# Patient Record
Sex: FEMALE | Race: WHITE | HISPANIC OR LATINO | Employment: UNEMPLOYED | URBAN - METROPOLITAN AREA
[De-identification: names, ages, dates, MRNs, and addresses within clinical notes are randomized per-mention and may not be internally consistent; named-entity substitution may affect disease eponyms.]

---

## 2017-01-18 ENCOUNTER — ALLSCRIPTS OFFICE VISIT (OUTPATIENT)
Dept: OTHER | Facility: OTHER | Age: 52
End: 2017-01-18

## 2017-02-24 ENCOUNTER — APPOINTMENT (EMERGENCY)
Dept: RADIOLOGY | Facility: HOSPITAL | Age: 52
End: 2017-02-24
Payer: COMMERCIAL

## 2017-02-24 ENCOUNTER — HOSPITAL ENCOUNTER (EMERGENCY)
Facility: HOSPITAL | Age: 52
Discharge: HOME | End: 2017-02-24
Attending: EMERGENCY MEDICINE | Admitting: EMERGENCY MEDICINE
Payer: COMMERCIAL

## 2017-02-24 VITALS
SYSTOLIC BLOOD PRESSURE: 140 MMHG | OXYGEN SATURATION: 98 % | DIASTOLIC BLOOD PRESSURE: 78 MMHG | TEMPERATURE: 96.5 F | RESPIRATION RATE: 18 BRPM | BODY MASS INDEX: 38.74 KG/M2 | HEART RATE: 77 BPM | WEIGHT: 240 LBS

## 2017-02-24 DIAGNOSIS — N39.0 UTI (URINARY TRACT INFECTION): Primary | ICD-10-CM

## 2017-02-24 DIAGNOSIS — M54.9 ACUTE BACK PAIN: ICD-10-CM
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
## 2017-02-24 LAB
BACTERIA UR QL AUTO: ABNORMAL /HPF
BILIRUB UR QL STRIP: NEGATIVE
CLARITY UR: CLEAR
COLOR UR: YELLOW
GLUCOSE UR STRIP-MCNC: NEGATIVE MG/DL
HGB UR QL STRIP.AUTO: ABNORMAL
HYALINE CASTS #/AREA URNS LPF: ABNORMAL /LPF
KETONES UR STRIP-MCNC: NEGATIVE MG/DL
LEUKOCYTE ESTERASE UR QL STRIP: NEGATIVE
NITRITE UR QL STRIP: NEGATIVE
NON-SQ EPI CELLS URNS QL MICRO: ABNORMAL /HPF
PH UR STRIP.AUTO: 6.5 [PH] (ref 4.5–8)
PROT UR STRIP-MCNC: NEGATIVE MG/DL
RBC #/AREA URNS AUTO: ABNORMAL /HPF
SP GR UR STRIP.AUTO: 1.01 (ref 1–1.03)
UROBILINOGEN UR QL STRIP.AUTO: 1 E.U./DL
WBC #/AREA URNS AUTO: ABNORMAL /HPF

## 2017-02-24 PROCEDURE — 96372 THER/PROPH/DIAG INJ SC/IM: CPT

## 2017-02-24 PROCEDURE — 51798 US URINE CAPACITY MEASURE: CPT

## 2017-02-24 PROCEDURE — 87086 URINE CULTURE/COLONY COUNT: CPT | Performed by: EMERGENCY MEDICINE

## 2017-02-24 PROCEDURE — 81001 URINALYSIS AUTO W/SCOPE: CPT | Performed by: EMERGENCY MEDICINE

## 2017-02-24 PROCEDURE — 72100 X-RAY EXAM L-S SPINE 2/3 VWS: CPT

## 2017-02-24 PROCEDURE — 73521 X-RAY EXAM HIPS BI 2 VIEWS: CPT

## 2017-02-24 PROCEDURE — 99284 EMERGENCY DEPT VISIT MOD MDM: CPT

## 2017-02-24 RX ORDER — KETOROLAC TROMETHAMINE 30 MG/ML
15 INJECTION, SOLUTION INTRAMUSCULAR; INTRAVENOUS ONCE
Status: COMPLETED | OUTPATIENT
Start: 2017-02-24 | End: 2017-02-24

## 2017-02-24 RX ORDER — LIDOCAINE 50 MG/G
1 PATCH TOPICAL ONCE
Qty: 30 PATCH | Refills: 0 | Status: SHIPPED | OUTPATIENT
Start: 2017-02-24 | End: 2018-07-12 | Stop reason: ALTCHOICE

## 2017-02-24 RX ORDER — METHOCARBAMOL 500 MG/1
500 TABLET, FILM COATED ORAL 3 TIMES DAILY
Qty: 30 TABLET | Refills: 0 | Status: SHIPPED | OUTPATIENT
Start: 2017-02-24 | End: 2018-07-12 | Stop reason: ALTCHOICE

## 2017-02-24 RX ORDER — LIDOCAINE 50 MG/G
1 PATCH TOPICAL ONCE
Status: DISCONTINUED | OUTPATIENT
Start: 2017-02-24 | End: 2017-02-24 | Stop reason: HOSPADM

## 2017-02-24 RX ORDER — METHOCARBAMOL 500 MG/1
500 TABLET, FILM COATED ORAL ONCE
Status: COMPLETED | OUTPATIENT
Start: 2017-02-24 | End: 2017-02-24

## 2017-02-24 RX ORDER — CEPHALEXIN 500 MG/1
500 CAPSULE ORAL 4 TIMES DAILY
Qty: 20 CAPSULE | Refills: 0 | Status: SHIPPED | OUTPATIENT
Start: 2017-02-24 | End: 2017-03-01

## 2017-02-24 RX ORDER — NAPROXEN 500 MG/1
500 TABLET ORAL 2 TIMES DAILY WITH MEALS
Qty: 60 TABLET | Refills: 0 | Status: SHIPPED | OUTPATIENT
Start: 2017-02-24 | End: 2018-08-09 | Stop reason: ALTCHOICE

## 2017-02-24 RX ADMIN — METHOCARBAMOL 500 MG: 500 TABLET ORAL at 11:11

## 2017-02-24 RX ADMIN — LIDOCAINE 1 PATCH: 50 PATCH CUTANEOUS at 11:12

## 2017-02-24 RX ADMIN — KETOROLAC TROMETHAMINE 15 MG: 30 INJECTION, SOLUTION INTRAMUSCULAR at 10:42

## 2017-02-25 LAB — BACTERIA UR CULT: NORMAL

## 2017-03-23 ENCOUNTER — HOSPITAL ENCOUNTER (EMERGENCY)
Facility: HOSPITAL | Age: 52
Discharge: HOME | End: 2017-03-23
Attending: EMERGENCY MEDICINE | Admitting: EMERGENCY MEDICINE
Payer: COMMERCIAL

## 2017-03-23 VITALS
SYSTOLIC BLOOD PRESSURE: 147 MMHG | DIASTOLIC BLOOD PRESSURE: 93 MMHG | WEIGHT: 248 LBS | RESPIRATION RATE: 18 BRPM | BODY MASS INDEX: 39.86 KG/M2 | HEART RATE: 80 BPM | OXYGEN SATURATION: 99 % | TEMPERATURE: 98 F | HEIGHT: 66 IN

## 2017-03-23 DIAGNOSIS — S39.012A LUMBOSACRAL STRAIN, INITIAL ENCOUNTER: Primary | ICD-10-CM

## 2017-03-23 PROCEDURE — 99283 EMERGENCY DEPT VISIT LOW MDM: CPT

## 2017-03-23 RX ORDER — DIAZEPAM 5 MG/1
5 TABLET ORAL EVERY 6 HOURS PRN
Qty: 9 TABLET | Refills: 0 | Status: SHIPPED | OUTPATIENT
Start: 2017-03-23 | End: 2018-07-12 | Stop reason: ALTCHOICE

## 2017-03-23 RX ORDER — DIAZEPAM 5 MG/1
5 TABLET ORAL ONCE
Status: COMPLETED | OUTPATIENT
Start: 2017-03-23 | End: 2017-03-23

## 2017-03-23 RX ORDER — KETOROLAC TROMETHAMINE 30 MG/ML
15 INJECTION, SOLUTION INTRAMUSCULAR; INTRAVENOUS ONCE
Status: COMPLETED | OUTPATIENT
Start: 2017-03-23 | End: 2017-03-23

## 2017-03-23 RX ADMIN — KETOROLAC TROMETHAMINE 15 MG: 30 INJECTION, SOLUTION INTRAMUSCULAR at 11:17

## 2017-03-23 RX ADMIN — DIAZEPAM 5 MG: 5 TABLET ORAL at 11:18

## 2017-06-26 ENCOUNTER — HOSPITAL ENCOUNTER (EMERGENCY)
Facility: HOSPITAL | Age: 52
Discharge: HOME | End: 2017-06-26
Attending: EMERGENCY MEDICINE
Payer: COMMERCIAL

## 2017-06-26 ENCOUNTER — APPOINTMENT (EMERGENCY)
Dept: RADIOLOGY | Facility: HOSPITAL | Age: 52
End: 2017-06-26
Payer: COMMERCIAL

## 2017-06-26 VITALS
DIASTOLIC BLOOD PRESSURE: 59 MMHG | BODY MASS INDEX: 40.18 KG/M2 | TEMPERATURE: 96.8 F | WEIGHT: 250 LBS | HEIGHT: 66 IN | SYSTOLIC BLOOD PRESSURE: 111 MMHG | RESPIRATION RATE: 20 BRPM | HEART RATE: 69 BPM | OXYGEN SATURATION: 94 %

## 2017-06-26 DIAGNOSIS — J06.9 UPPER RESPIRATORY INFECTION, ACUTE: Primary | ICD-10-CM

## 2017-06-26 LAB
ATRIAL RATE: 62 BPM
P AXIS: 18 DEGREES
PR INTERVAL: 106 MS
QRS AXIS: 39 DEGREES
QRSD INTERVAL: 70 MS
QT INTERVAL: 404 MS
QTC INTERVAL: 410 MS
T WAVE AXIS: 39 DEGREES
VENTRICULAR RATE: 62 BPM

## 2017-06-26 PROCEDURE — 94640 AIRWAY INHALATION TREATMENT: CPT

## 2017-06-26 PROCEDURE — 99283 EMERGENCY DEPT VISIT LOW MDM: CPT

## 2017-06-26 PROCEDURE — 93005 ELECTROCARDIOGRAM TRACING: CPT | Performed by: EMERGENCY MEDICINE

## 2017-06-26 PROCEDURE — 71020 HB CHEST X-RAY 2VW FRONTAL&LATL: CPT

## 2017-06-26 RX ORDER — ALBUTEROL SULFATE 90 UG/1
2 AEROSOL, METERED RESPIRATORY (INHALATION) EVERY 4 HOURS PRN
Qty: 1 INHALER | Refills: 0 | Status: SHIPPED | OUTPATIENT
Start: 2017-06-26 | End: 2022-03-24

## 2017-06-26 RX ORDER — PREDNISONE 20 MG/1
40 TABLET ORAL DAILY
Qty: 10 TABLET | Refills: 0 | Status: SHIPPED | OUTPATIENT
Start: 2017-06-26 | End: 2017-07-01

## 2017-06-26 RX ADMIN — IPRATROPIUM BROMIDE 0.5 MG: 0.5 SOLUTION RESPIRATORY (INHALATION) at 12:45

## 2017-06-26 RX ADMIN — ALBUTEROL SULFATE 5 MG: 2.5 SOLUTION RESPIRATORY (INHALATION) at 12:45

## 2017-07-06 ENCOUNTER — GENERIC CONVERSION - ENCOUNTER (OUTPATIENT)
Dept: OTHER | Facility: OTHER | Age: 52
End: 2017-07-06

## 2017-08-02 ENCOUNTER — TRANSCRIBE ORDERS (OUTPATIENT)
Dept: LAB | Facility: HOSPITAL | Age: 52
End: 2017-08-02

## 2017-08-02 ENCOUNTER — APPOINTMENT (OUTPATIENT)
Dept: LAB | Facility: HOSPITAL | Age: 52
End: 2017-08-02
Payer: COMMERCIAL

## 2017-08-02 DIAGNOSIS — E66.01 MORBID OBESITY, UNSPECIFIED OBESITY TYPE (HCC): ICD-10-CM

## 2017-08-02 DIAGNOSIS — E66.9 OBESITY, UNSPECIFIED: Primary | ICD-10-CM

## 2017-08-02 DIAGNOSIS — J06.9 ACUTE UPPER RESPIRATORY INFECTIONS OF OTHER MULTIPLE SITES: ICD-10-CM

## 2017-08-02 DIAGNOSIS — K25.9 GASTRIC ULCER DUE TO HELICOBACTER PYLORI, UNSPECIFIED ULCER CHRONICITY: ICD-10-CM

## 2017-08-02 DIAGNOSIS — K25.9 GASTRIC ULCER DUE TO HELICOBACTER PYLORI, UNSPECIFIED ULCER CHRONICITY: Primary | ICD-10-CM

## 2017-08-02 DIAGNOSIS — B96.81 GASTRIC ULCER DUE TO HELICOBACTER PYLORI, UNSPECIFIED ULCER CHRONICITY: ICD-10-CM

## 2017-08-02 DIAGNOSIS — Z13.29 SCREENING FOR THYROID DISORDER: ICD-10-CM

## 2017-08-02 DIAGNOSIS — B96.81 GASTRIC ULCER DUE TO HELICOBACTER PYLORI, UNSPECIFIED ULCER CHRONICITY: Primary | ICD-10-CM

## 2017-08-02 DIAGNOSIS — E03.9 UNSPECIFIED HYPOTHYROIDISM: ICD-10-CM

## 2017-08-02 DIAGNOSIS — E66.9 OBESITY, UNSPECIFIED: ICD-10-CM

## 2017-08-02 LAB
ALBUMIN SERPL BCP-MCNC: 3.3 G/DL (ref 3.5–5)
ALP SERPL-CCNC: 114 U/L (ref 46–116)
ALT SERPL W P-5'-P-CCNC: 27 U/L (ref 12–78)
ANION GAP SERPL CALCULATED.3IONS-SCNC: 5 MMOL/L (ref 4–13)
AST SERPL W P-5'-P-CCNC: 22 U/L (ref 5–45)
BASOPHILS # BLD AUTO: 0.01 THOUSANDS/ΜL (ref 0–0.1)
BASOPHILS NFR BLD AUTO: 0 % (ref 0–1)
BILIRUB SERPL-MCNC: 0.25 MG/DL (ref 0.2–1)
BUN SERPL-MCNC: 8 MG/DL (ref 5–25)
CALCIUM SERPL-MCNC: 9 MG/DL (ref 8.3–10.1)
CHLORIDE SERPL-SCNC: 110 MMOL/L (ref 100–108)
CO2 SERPL-SCNC: 27 MMOL/L (ref 21–32)
CREAT SERPL-MCNC: 0.68 MG/DL (ref 0.6–1.3)
EOSINOPHIL # BLD AUTO: 0.05 THOUSAND/ΜL (ref 0–0.61)
EOSINOPHIL NFR BLD AUTO: 1 % (ref 0–6)
ERYTHROCYTE [DISTWIDTH] IN BLOOD BY AUTOMATED COUNT: 12.9 % (ref 11.6–15.1)
GFR SERPL CREATININE-BSD FRML MDRD: 103 ML/MIN/1.73SQ M
GLUCOSE SERPL-MCNC: 72 MG/DL (ref 65–140)
HCT VFR BLD AUTO: 42.2 % (ref 34.8–46.1)
HGB BLD-MCNC: 14.2 G/DL (ref 11.5–15.4)
LYMPHOCYTES # BLD AUTO: 2.09 THOUSANDS/ΜL (ref 0.6–4.47)
LYMPHOCYTES NFR BLD AUTO: 26 % (ref 14–44)
MCH RBC QN AUTO: 32.7 PG (ref 26.8–34.3)
MCHC RBC AUTO-ENTMCNC: 33.6 G/DL (ref 31.4–37.4)
MCV RBC AUTO: 97 FL (ref 82–98)
MONOCYTES # BLD AUTO: 0.6 THOUSAND/ΜL (ref 0.17–1.22)
MONOCYTES NFR BLD AUTO: 8 % (ref 4–12)
NEUTROPHILS # BLD AUTO: 5.26 THOUSANDS/ΜL (ref 1.85–7.62)
NEUTS SEG NFR BLD AUTO: 65 % (ref 43–75)
NRBC BLD AUTO-RTO: 0 /100 WBCS
PLATELET # BLD AUTO: 224 THOUSANDS/UL (ref 149–390)
PMV BLD AUTO: 11.2 FL (ref 8.9–12.7)
POTASSIUM SERPL-SCNC: 3.9 MMOL/L (ref 3.5–5.3)
PROT SERPL-MCNC: 7.2 G/DL (ref 6.4–8.2)
RBC # BLD AUTO: 4.34 MILLION/UL (ref 3.81–5.12)
SODIUM SERPL-SCNC: 142 MMOL/L (ref 136–145)
TSH SERPL DL<=0.05 MIU/L-ACNC: 1.71 UIU/ML (ref 0.36–3.74)
WBC # BLD AUTO: 8.03 THOUSAND/UL (ref 4.31–10.16)

## 2017-08-02 PROCEDURE — 80053 COMPREHEN METABOLIC PANEL: CPT

## 2017-08-02 PROCEDURE — 84443 ASSAY THYROID STIM HORMONE: CPT

## 2017-08-02 PROCEDURE — 36415 COLL VENOUS BLD VENIPUNCTURE: CPT

## 2017-08-02 PROCEDURE — 85025 COMPLETE CBC W/AUTO DIFF WBC: CPT

## 2017-08-02 PROCEDURE — 87338 HPYLORI STOOL AG IA: CPT

## 2017-08-03 LAB — H PYLORI AG STL QL IA: NEGATIVE

## 2017-10-01 ENCOUNTER — HOSPITAL ENCOUNTER (EMERGENCY)
Facility: HOSPITAL | Age: 52
Discharge: HOME | End: 2017-10-01
Attending: EMERGENCY MEDICINE
Payer: COMMERCIAL

## 2017-10-01 ENCOUNTER — APPOINTMENT (EMERGENCY)
Dept: RADIOLOGY | Facility: HOSPITAL | Age: 52
End: 2017-10-01
Payer: COMMERCIAL

## 2017-10-01 VITALS
TEMPERATURE: 98.5 F | OXYGEN SATURATION: 98 % | HEART RATE: 72 BPM | DIASTOLIC BLOOD PRESSURE: 58 MMHG | BODY MASS INDEX: 39.54 KG/M2 | RESPIRATION RATE: 18 BRPM | WEIGHT: 245 LBS | SYSTOLIC BLOOD PRESSURE: 122 MMHG

## 2017-10-01 DIAGNOSIS — R07.9 CHEST PAIN: ICD-10-CM

## 2017-10-01 DIAGNOSIS — J40 BRONCHITIS: ICD-10-CM

## 2017-10-01 DIAGNOSIS — J32.9 SINUSITIS: Primary | ICD-10-CM

## 2017-10-01 LAB
ALBUMIN SERPL BCP-MCNC: 3 G/DL (ref 3.5–5)
ALP SERPL-CCNC: 105 U/L (ref 46–116)
ALT SERPL W P-5'-P-CCNC: 23 U/L (ref 12–78)
ANION GAP SERPL CALCULATED.3IONS-SCNC: 5 MMOL/L (ref 4–13)
AST SERPL W P-5'-P-CCNC: 16 U/L (ref 5–45)
BASOPHILS # BLD AUTO: 0 THOUSANDS/ΜL (ref 0–0.1)
BASOPHILS NFR BLD AUTO: 0 % (ref 0–1)
BILIRUB SERPL-MCNC: 0.16 MG/DL (ref 0.2–1)
BUN SERPL-MCNC: 14 MG/DL (ref 5–25)
CALCIUM SERPL-MCNC: 8.8 MG/DL (ref 8.3–10.1)
CHLORIDE SERPL-SCNC: 107 MMOL/L (ref 100–108)
CO2 SERPL-SCNC: 30 MMOL/L (ref 21–32)
CREAT SERPL-MCNC: 0.65 MG/DL (ref 0.6–1.3)
DEPRECATED D DIMER PPP: 447 NG/ML (FEU) (ref 0–424)
EOSINOPHIL # BLD AUTO: 0.07 THOUSAND/ΜL (ref 0–0.61)
EOSINOPHIL NFR BLD AUTO: 1 % (ref 0–6)
ERYTHROCYTE [DISTWIDTH] IN BLOOD BY AUTOMATED COUNT: 12.9 % (ref 11.6–15.1)
GFR SERPL CREATININE-BSD FRML MDRD: 104 ML/MIN/1.73SQ M
GLUCOSE SERPL-MCNC: 96 MG/DL (ref 65–140)
HCT VFR BLD AUTO: 40.5 % (ref 34.8–46.1)
HGB BLD-MCNC: 13.6 G/DL (ref 11.5–15.4)
LYMPHOCYTES # BLD AUTO: 2.69 THOUSANDS/ΜL (ref 0.6–4.47)
LYMPHOCYTES NFR BLD AUTO: 32 % (ref 14–44)
MCH RBC QN AUTO: 33.2 PG (ref 26.8–34.3)
MCHC RBC AUTO-ENTMCNC: 33.6 G/DL (ref 31.4–37.4)
MCV RBC AUTO: 99 FL (ref 82–98)
MONOCYTES # BLD AUTO: 0.51 THOUSAND/ΜL (ref 0.17–1.22)
MONOCYTES NFR BLD AUTO: 6 % (ref 4–12)
NEUTROPHILS # BLD AUTO: 5.04 THOUSANDS/ΜL (ref 1.85–7.62)
NEUTS SEG NFR BLD AUTO: 61 % (ref 43–75)
NRBC BLD AUTO-RTO: 0 /100 WBCS
PLATELET # BLD AUTO: 217 THOUSANDS/UL (ref 149–390)
PMV BLD AUTO: 11.6 FL (ref 8.9–12.7)
POTASSIUM SERPL-SCNC: 3.9 MMOL/L (ref 3.5–5.3)
PROT SERPL-MCNC: 6.7 G/DL (ref 6.4–8.2)
RBC # BLD AUTO: 4.1 MILLION/UL (ref 3.81–5.12)
SODIUM SERPL-SCNC: 142 MMOL/L (ref 136–145)
SPECIMEN SOURCE: NORMAL
TROPONIN I BLD-MCNC: 0 NG/ML (ref 0–0.08)
TROPONIN I SERPL-MCNC: <0.02 NG/ML
WBC # BLD AUTO: 8.33 THOUSAND/UL (ref 4.31–10.16)

## 2017-10-01 PROCEDURE — 36415 COLL VENOUS BLD VENIPUNCTURE: CPT

## 2017-10-01 PROCEDURE — 85025 COMPLETE CBC W/AUTO DIFF WBC: CPT | Performed by: EMERGENCY MEDICINE

## 2017-10-01 PROCEDURE — 85379 FIBRIN DEGRADATION QUANT: CPT | Performed by: EMERGENCY MEDICINE

## 2017-10-01 PROCEDURE — 99285 EMERGENCY DEPT VISIT HI MDM: CPT

## 2017-10-01 PROCEDURE — 96372 THER/PROPH/DIAG INJ SC/IM: CPT

## 2017-10-01 PROCEDURE — 84484 ASSAY OF TROPONIN QUANT: CPT

## 2017-10-01 PROCEDURE — 71020 HB CHEST X-RAY 2VW FRONTAL&LATL: CPT | Performed by: EMERGENCY MEDICINE

## 2017-10-01 PROCEDURE — 80053 COMPREHEN METABOLIC PANEL: CPT | Performed by: EMERGENCY MEDICINE

## 2017-10-01 PROCEDURE — 84484 ASSAY OF TROPONIN QUANT: CPT | Performed by: EMERGENCY MEDICINE

## 2017-10-01 PROCEDURE — 93005 ELECTROCARDIOGRAM TRACING: CPT | Performed by: EMERGENCY MEDICINE

## 2017-10-01 RX ORDER — KETOROLAC TROMETHAMINE 30 MG/ML
15 INJECTION, SOLUTION INTRAMUSCULAR; INTRAVENOUS ONCE
Status: COMPLETED | OUTPATIENT
Start: 2017-10-01 | End: 2017-10-01

## 2017-10-01 RX ADMIN — KETOROLAC TROMETHAMINE 15 MG: 30 INJECTION, SOLUTION INTRAMUSCULAR at 20:05

## 2017-10-01 NOTE — ED PROVIDER NOTES
History  Chief Complaint   Patient presents with    Shortness of Breath     Patient reports head ache for the past 5 days, reports new onset of SOB since earlier today, with stabbing chest pain with deep inspiration   Headache     This is a 61-year-old female presenting to the emergency department with waxing and waning headache, nausea, and dizziness for the last 5 days in addition to sharp chest pain for the last 1 day  She says that she has been having a a frontal throbbing headache for the last 5 days that comes and goes  She gets some relief with Tylenol and Motrin, but does not completely resolve headache  She says when her headache is at its worst she also feels dizzy and nauseous  She denies any vomiting  She says she started to notice a sharp chest pain to the left of her sternum yesterday afternoon that was not very significant when it started, but today it has gotten much worse  She has pain especially with deep inspiration  She denies any other aggravating or alleviating factors  She associates this pain with shortness of breath as well  She smokes a pack a day of cigarettes normally, but has only been able to smoke 6 cigarettes a because of her shortness of breath  She also admits to a cough that has been getting worse over the last several days  It is productive of a yellow sputum  She denies any fevers or chills, any abdominal pain, or any diarrhea or constipation  Prior to Admission Medications   Prescriptions Last Dose Informant Patient Reported? Taking? Zolpidem Tartrate (AMBIEN PO)   Yes No   Sig: Take by mouth daily at bedtime as needed     albuterol (PROVENTIL HFA,VENTOLIN HFA) 90 mcg/act inhaler   No No   Sig: Inhale 2 puffs every 4 (four) hours as needed for wheezing or shortness of breath   diazepam (VALIUM) 5 mg tablet   No No   Sig: Take 1 tablet by mouth every 6 (six) hours as needed for anxiety for up to 9 doses   gabapentin (NEURONTIN) 300 mg capsule   Yes No Sig: Take 300 mg by mouth 3 (three) times a day  lidocaine (LIDODERM) 5 %   No No   Sig: Place 1 patch on the skin once for 1 dose Remove & Discard patch within 12 hours or as directed by MD   meclizine (ANTIVERT) 25 mg tablet   No No   Sig: Take 1 tablet (25 mg total) by mouth 3 (three) times a day as needed for dizziness  methocarbamol (ROBAXIN) 500 mg tablet   No No   Sig: Take 1 tablet by mouth 3 (three) times a day for 10 days   naproxen (NAPROSYN) 500 mg tablet   No No   Sig: Take 1 tablet by mouth 2 (two) times a day with meals for 30 days   traMADol (ULTRAM) 50 mg tablet   Yes No   Sig: Take 50 mg by mouth 2 (two) times a day  verapamil (CALAN) 80 mg tablet   Yes No   Sig: Take 80 mg by mouth 3 (three) times a day  Pt reports, stopped on her own as she read it was recalled  Advised to notify her PCP   Pt stating "I only take this when my blood pressure is elevated or have a headache"       Facility-Administered Medications: None       Past Medical History:   Diagnosis Date    Anxiety     Depression     h/o suicide attempt    Hypertension     Nephrolithiasis     h/o    Osteoarthritis     Radiculopathy     Vitamin D deficiency        Past Surgical History:   Procedure Laterality Date    ABDOMINAL HYSTERECTOMY      UMBERTO & BSO d/t/ endometriosis    APPENDECTOMY      CARPAL TUNNEL RELEASE Bilateral     CHOLECYSTECTOMY      ELBOW SURGERY Left     HEMORROIDECTOMY      LATERAL EPICONDYLE RELEASE Right 1/25/2016    Procedure: RIGHT ELBOW TENNIS ELBOW RELEASE;  Surgeon: Edin Richardson MD;  Location:  MAIN OR;  Service:    Rosy VILLARREAL/SPARKLE W/DIST Sherryle Garner Right 4/8/2016    Procedure: Daily Ramos;  Surgeon: Samanta Guadarrama DPM;  Location: [facility];  Service: Podiatry    ME PART EXCIS 5TH METATARSAL HEAD Right 4/8/2016    Procedure: Dorathy Covarrubias;  Surgeon: Samanta Guadarrama DPM;  Location: [facility];  Service: Podiatry    SHOULDER ARTHROSCOPY Right        Family History Problem Relation Age of Onset    Heart disease Mother     Hypertension Mother     Rheum arthritis Mother     Heart disease Father     Hypertension Father     Diabetes Father     Bipolar disorder Sister     Early death Brother     Cervical cancer Daughter      I have reviewed and agree with the history as documented  Social History   Substance Use Topics    Smoking status: Current Every Day Smoker     Packs/day: 1 00     Years: 35 00    Smokeless tobacco: Never Used      Comment: smokes X 35 yrs, 1ppd    Alcohol use No      Comment: socially        Review of Systems   Constitutional: Negative for chills and fever  HENT: Positive for congestion and sinus pressure  Negative for hearing loss, sore throat and tinnitus  Eyes: Negative for photophobia  Respiratory: Positive for cough and shortness of breath  Cardiovascular: Positive for chest pain  Negative for palpitations and leg swelling  Gastrointestinal: Negative for abdominal pain, diarrhea, nausea and vomiting  Genitourinary: Negative for difficulty urinating and dysuria  Musculoskeletal: Negative for myalgias  Skin: Negative for rash  Neurological: Positive for dizziness and headaches  Negative for syncope, weakness, light-headedness and numbness  Physical Exam  ED Triage Vitals [10/01/17 1636]   Temperature Pulse Respirations Blood Pressure SpO2   98 5 °F (36 9 °C) 89 18 119/60 97 %      Temp Source Heart Rate Source Patient Position - Orthostatic VS BP Location FiO2 (%)   Oral Monitor Lying Right arm --      Pain Score       6           Physical Exam   Constitutional: She is oriented to person, place, and time  She appears well-developed and well-nourished  No distress  HENT:   Head: Normocephalic and atraumatic  Mouth/Throat: Oropharynx is clear and moist    Tenderness to palpation over the frontal sinuses  No tenderness over the maxillary sinuses     Eyes: EOM are normal  Pupils are equal, round, and reactive to light    Neck: Normal range of motion  Neck supple  Cardiovascular: Normal rate, regular rhythm, normal heart sounds and intact distal pulses  Pulmonary/Chest: Effort normal and breath sounds normal  No respiratory distress  Abdominal: Soft  Bowel sounds are normal  There is no tenderness  Musculoskeletal: Normal range of motion  She exhibits no edema  Normal strength in the upper and lower extremities  Lymphadenopathy:     She has no cervical adenopathy  Neurological: She is alert and oriented to person, place, and time  No cranial nerve deficit or sensory deficit  Coordination normal    Normal gait   Skin: Skin is warm and dry  Capillary refill takes less than 2 seconds  Psychiatric: She has a normal mood and affect  Nursing note and vitals reviewed  ED Medications  Medications   ketorolac (TORADOL) 30 mg/mL injection 15 mg (15 mg Intramuscular Given 10/1/17 2005)       Diagnostic Studies  Labs Reviewed   COMPREHENSIVE METABOLIC PANEL - Abnormal        Result Value Ref Range Status    Albumin 3 0 (*) 3 5 - 5 0 g/dL Final    Total Bilirubin 0 16 (*) 0 20 - 1 00 mg/dL Final    Sodium 142  136 - 145 mmol/L Final    Potassium 3 9  3 5 - 5 3 mmol/L Final    Chloride 107  100 - 108 mmol/L Final    CO2 30  21 - 32 mmol/L Final    Anion Gap 5  4 - 13 mmol/L Final    BUN 14  5 - 25 mg/dL Final    Creatinine 0 65  0 60 - 1 30 mg/dL Final    Comment: Standardized to IDMS reference method    Glucose 96  65 - 140 mg/dL Final    Comment:   If the patient is fasting, the ADA then defines impaired fasting glucose as > 100 mg/dL and diabetes as > or equal to 123 mg/dL  Specimen collection should occur prior to Sulfasalazine administration due to the potential for falsely depressed results  Specimen collection should occur prior to Sulfapyridine administration due to the potential for falsely elevated results      Calcium 8 8  8 3 - 10 1 mg/dL Final    AST 16  5 - 45 U/L Final    Comment:   Specimen collection should occur prior to Sulfasalazine administration due to the potential for falsely depressed results  ALT 23  12 - 78 U/L Final    Comment:   Specimen collection should occur prior to Sulfasalazine and/or Sulfapyridine administration due to the potential for falsely depressed results  Alkaline Phosphatase 105  46 - 116 U/L Final    Total Protein 6 7  6 4 - 8 2 g/dL Final    eGFR 104  ml/min/1 73sq m Final    Narrative:     National Kidney Disease Education Program recommendations are as follows:  GFR calculation is accurate only with a steady state creatinine  Chronic Kidney disease less than 60 ml/min/1 73 sq  meters  Kidney failure less than 15 ml/min/1 73 sq  meters  CBC AND DIFFERENTIAL - Abnormal     MCV 99 (*) 82 - 98 fL Final    WBC 8 33  4 31 - 10 16 Thousand/uL Final    RBC 4 10  3 81 - 5 12 Million/uL Final    Hemoglobin 13 6  11 5 - 15 4 g/dL Final    Hematocrit 40 5  34 8 - 46 1 % Final    MCH 33 2  26 8 - 34 3 pg Final    MCHC 33 6  31 4 - 37 4 g/dL Final    RDW 12 9  11 6 - 15 1 % Final    MPV 11 6  8 9 - 12 7 fL Final    Platelets 242  771 - 390 Thousands/uL Final    nRBC 0  /100 WBCs Final    Neutrophils Relative 61  43 - 75 % Final    Lymphocytes Relative 32  14 - 44 % Final    Monocytes Relative 6  4 - 12 % Final    Eosinophils Relative 1  0 - 6 % Final    Basophils Relative 0  0 - 1 % Final    Neutrophils Absolute 5 04  1 85 - 7 62 Thousands/µL Final    Lymphocytes Absolute 2 69  0 60 - 4 47 Thousands/µL Final    Monocytes Absolute 0 51  0 17 - 1 22 Thousand/µL Final    Eosinophils Absolute 0 07  0 00 - 0 61 Thousand/µL Final    Basophils Absolute 0 00  0 00 - 0 10 Thousands/µL Final   D-DIMER, QUANTITATIVE - Abnormal     D-Dimer, Quant 447 (*) 0 - 424 ng/ml (FEU) Final    Comment:   Reference and upper limits to exclude DVT and PE are the same  Do not use to exclude if clinical symptoms are present        Pregnant women:  1st trimester:  <220 - 1060 ng/ml (FEU)  2nd trimester:  <220 - 1880 ng/ml (FEU)  3rd trimester:   238 - 3280 ng/ml (FEU)         TROPONIN I - Normal    Troponin I <0 02  <=0 04 ng/mL Final    Narrative:     [employer] Chemistry analyzer 99% cutoff is > 0 04 ng/mL in network labs    o cTnI 99% cutoff is useful only when applied to patients in the clinical setting of myocardial ischemia  o cTnI 99% cutoff should be interpreted in the context of clinical history, ECG findings and possibly cardiac imaging to establish correct diagnosis  o cTnI 99% cutoff may be suggestive but clearly not indicative of a coronary event without the clinical setting of myocardial ischemia  POCT TROPONIN - Normal    POC Troponin I 0 00  0 00 - 0 08 ng/ml Final    Specimen Type VENOUS   Final    Narrative:     [employer] i-Stat handheld analyzer 99% cutoff is > 0 08ng/mL in network Emergency Departments    o cTnI 99% cutoff is useful only when applied to patients in the clinical setting of myocardial ischemia  o cTnI 99% cutoff should be interpreted in the context of clinical history, ECG findings and possibly cardiac imaging to establish correct diagnosis  o cTnI 99% cutoff may be suggestive but clearly not indicative of a coronary event without the clinical setting of myocardial ischemia     LIGHT BLUE TOP       X-ray chest 2 views   ED Interpretation   No acute findings          Procedures  ECG 12 Lead Documentation  Date/Time: 10/1/2017 7:35 PM  Performed by: Giovana Haynes  Authorized by: Alexandro Castro     ECG reviewed by me, the ED Provider: yes    Patient location:  ED  Previous ECG:     Previous ECG:  Compared to current    Similarity:  No change  Interpretation:     Interpretation: normal    Rate:     ECG rate assessment: normal    Rhythm:     Rhythm: sinus rhythm    QRS:     QRS axis:  Normal  ST segments:     ST segments:  Normal  T waves:     T waves: normal            Phone Consults  ED Phone Contact    ED Course  ED Course as of Oct 01 2048   Sun Oct 01, 2017   1939 Age adjusted is below 500 D-DIMER QUANTITATIVE: (!) 447                               MDM  Number of Diagnoses or Management Options  Chest pain:   Sinusitis:   Diagnosis management comments: This is a 53-year-old female presenting to the emergency department with 5 days of intermittent nausea, dizziness, and headache with sinus pressure, shortness of breath, and chest pain that is suspicious for acute sinusitis and bronchitis  She had a normal EKG in the emergency department and 2 troponins 3 hours apart were both normal  Vital signs were stable while she was in the department  Her chest x-ray, CBC, and CMP were all normal  Discharge precautions for chest pain were discussed she was encouraged to follow up with her primary care physician  Smoking cessation counseling was provided  CritCare Time    Disposition  Final diagnoses:   Sinusitis   Chest pain   Bronchitis     ED Disposition     ED Disposition Condition Comment    Discharge  Robert Marcano discharge to home/self care  Condition at discharge: Good        Follow-up Information     Follow up With Specialties Details Why [address] [facility] Nurse Practitioner  If symptoms worsen [address] [facility]  [phone]          Patient's Medications   Discharge Prescriptions    No medications on file     No discharge procedures on file  ED Provider  Attending physically available and evaluated Robert Marcano I managed the patient along with the ED Attending      Electronically Signed by       Maryellen Vargas MD  Resident  10/01/17 7812

## 2017-10-01 NOTE — ED ATTENDING ATTESTATION
Sharath Mann DO, saw and evaluated the patient  I have discussed the patient with the resident/non-physician practitioner and agree with the resident's/non-physician practitioner's findings, Plan of Care, and MDM as documented in the resident's/non-physician practitioner's note, except where noted  All available labs and Radiology studies were reviewed  At this point I agree with the current assessment done in the Emergency Department  I have conducted an independent evaluation of this patient a history and physical is as follows:    65-year-old female presented emergency department with chest pain and shortness of breath  She states that the symptoms started today and worsened after smoking her cigarette  In addition she has been coughing and this cough has worsened over the last several days and is productive of mucus  She denies any fevers  She was afebrile in the ED, had normal mental status, her lungs were clear, abdomen soft nontender nondistended, extremities without peripheral edema  She had an EKG has unremarkable, she had a chest x-ray that showed no acute disease, she had 2-troponins  She had a heart score of 3  It was recommended that she come in to the hospital for observation and potentially a stress test tomorrow  She declined admission and wished to go home  She was counseled on her tobacco abuse  She was advised to take daily aspirin so she sees her family doctor and she should call in the morning  She was discharge      Diagnosis  Chest pain  Dyspnea  Tobacco abuse    Discharged    Critical Care Time  CritCare Time

## 2017-10-01 NOTE — ED NOTES
2x IV attempts unsuccessful at this time   Dr Jang aware and to hold at this time with further attempts     Beckie Faye, RN  10/01/17 8656

## 2017-10-02 LAB
ATRIAL RATE: 71 BPM
P AXIS: 32 DEGREES
PR INTERVAL: 120 MS
QRS AXIS: 46 DEGREES
QRSD INTERVAL: 74 MS
QT INTERVAL: 418 MS
QTC INTERVAL: 454 MS
T WAVE AXIS: 42 DEGREES
VENTRICULAR RATE: 71 BPM

## 2017-10-02 NOTE — DISCHARGE INSTRUCTIONS
You have viral sinusitis and bronchitis that will get better with time  You can treat cough with a tbsp of raw honey  Tylenol and ibuprofen are appropriate medications to take for headache  He should start feeling better in a couple of days  If he do not follow-up with the primary care physician  If your symptoms get worse, particularly of chest pain  If you developed worsening shortness of breath, lightheadedness, or palpitations prescribe back to the emergency department for further evaluation  Bronchitis will be worsened by smoking and smoking increases risk of further lung disease in the future  Be sure to speak with your primary care physician about ways in which they can help you quit smoking  Acute Bronchitis   WHAT YOU NEED TO KNOW:   Acute bronchitis is swelling and irritation in the air passages of your lungs  This irritation may cause you to cough or have other breathing problems  Acute bronchitis often starts because of another illness, such as a cold or the flu  The illness spreads from your nose and throat to your windpipe and airways  Bronchitis is often called a chest cold  Acute bronchitis lasts about 3 to 6 weeks and is usually not a serious illness  Your cough can last for several weeks  DISCHARGE INSTRUCTIONS:   Return to the emergency department if:   · You cough up blood  · Your lips or fingernails turn blue  · You feel like you are not getting enough air when you breathe  Contact your healthcare provider if:   · You have a fever  · Your breathing problems do not go away or get worse  · Your cough does not get better within 4 weeks  · You have questions or concerns about your condition or care  Self-care:   · Get more rest   Rest helps your body to heal  Slowly start to do more each day  Rest when you feel it is needed  · Avoid irritants in the air  Avoid chemicals, fumes, and dust  Wear a face mask if you must work around dust or fumes   Stay inside on days when air pollution levels are high  If you have allergies, stay inside when pollen counts are high  Do not use aerosol products, such as spray-on deodorant, bug spray, and hair spray  · Do not smoke or be around others who smoke  Nicotine and other chemicals in cigarettes and cigars damages the cilia that move mucus out of your lungs  Ask your healthcare provider for information if you currently smoke and need help to quit  E-cigarettes or smokeless tobacco still contain nicotine  Talk to your healthcare provider before you use these products  · Drink liquids as directed  Liquids help keep your air passages moist and help you cough up mucus  You may need to drink more liquids when you have acute bronchitis  Ask how much liquid to drink each day and which liquids are best for you  · Use a humidifier or vaporizer  Use a cool mist humidifier or a vaporizer to increase air moisture in your home  This may make it easier for you to breathe and help decrease your cough  Decrease risk for acute bronchitis:   · Get the vaccinations you need  Ask your healthcare provider if you should get vaccinated against the flu or pneumonia  · Prevent the spread of germs  You can decrease your risk of acute bronchitis and other illnesses by doing the following:     [facility] [place], [place] [employer] your hands often with soap and water  Carry germ-killing hand lotion or gel with you  You can use the lotion or gel to clean your hands when soap and water are not available  ¨ Do not touch your eyes, nose, or mouth unless you have washed your hands first     ¨ Always cover your mouth when you cough to prevent the spread of germs  It is best to cough into a tissue or your shirt sleeve instead of into your hand  Ask those around you cover their mouths when they cough  ¨ Try to avoid people who have a cold or the flu  If you are sick, stay away from others as much as possible  Medicines:   Your healthcare provider may  give you any of the following:  · Ibuprofen or acetaminophen  are medicines that help lower your fever  They are available without a doctor's order  Ask your healthcare provider which medicine is right for you  Ask how much to take and how often to take it  Follow directions  These medicines can cause stomach bleeding if not taken correctly  Ibuprofen can cause kidney damage  Do not take ibuprofen if you have kidney disease, an ulcer, or allergies to aspirin  Acetaminophen can cause liver damage  Do not take more than 4,000 milligrams in 24 hours  · Decongestants  help loosen mucus in your lungs and make it easier to cough up  This can help you breathe easier  · Cough suppressants  decrease your urge to cough  If your cough produces mucus, do not take a cough suppressant unless your healthcare provider tells you to  Your healthcare provider may suggest that you take a cough suppressant at night so you can rest     · Inhalers  may be given  Your healthcare provider may give you one or more inhalers to help you breathe easier and cough less  An inhaler gives your medicine to open your airways  Ask your healthcare provider to show you how to use your inhaler correctly  · Take your medicine as directed  Contact your healthcare provider if you think your medicine is not helping or if you have side effects  Tell him of her if you are allergic to any medicine  Keep a list of the medicines, vitamins, and herbs you take  Include the amounts, and when and why you take them  Bring the list or the pill bottles to follow-up visits  Carry your medicine list with you in case of an emergency  Follow up with your healthcare provider as directed:  Write down questions you have so you will remember to ask them during your follow-up visits  © 2017 [employer] Information is for End User's use only and may not be sold, redistributed or otherwise used for commercial purposes   All illustrations and images included in CareNotes® are the copyrighted property of [employer]  or [employer]  The above information is an  only  It is not intended as medical advice for individual conditions or treatments  Talk to your doctor, nurse or pharmacist before following any medical regimen to see if it is safe and effective for you  Chest Pain   WHAT YOU NEED TO KNOW:   Chest pain can be caused by a range of conditions, from not serious to life-threatening  Chest pain can be a symptom of a digestive problem, such as acid reflux or a stomach ulcer  An anxiety attack or a strong emotion, such as anger, can also cause chest pain  Infection, inflammation, or a fracture in the bones or cartilage in your chest can cause pain or discomfort  Sometimes chest pain or pressure is caused by poor blood flow to your heart (angina)  Chest pain may also be caused by life-threatening conditions such as a heart attack or blood clot in your lungs  DISCHARGE INSTRUCTIONS:   Call 911 if:   · You have any of the following signs of a heart attack:      ¨ Squeezing, pressure, or pain in your chest that lasts longer than 5 minutes or returns    ¨ Discomfort or pain in your back, neck, jaw, stomach, or arm     ¨ Trouble breathing    ¨ Nausea or vomiting    ¨ Lightheadedness or a sudden cold sweat, especially with chest pain or trouble breathing    Return to the emergency department if:   · You have chest discomfort that gets worse, even with medicine  · You cough or vomit blood  · Your bowel movements are black or bloody  · You cannot stop vomiting, or it hurts to swallow  Contact your healthcare provider if:   · You have questions or concerns about your condition or care  Medicines:   · Medicines  may be given to treat the cause of your chest pain  Examples include pain medicine, anxiety medicine, or medicines to increase blood flow to your heart       · Do not take certain medicines without asking your healthcare provider first   These include NSAIDs, herbal or vitamin supplements, or hormones (estrogen or progestin)  · Take your medicine as directed  Contact your healthcare provider if you think your medicine is not helping or if you have side effects  Tell him or her if you are allergic to any medicine  Keep a list of the medicines, vitamins, and herbs you take  Include the amounts, and when and why you take them  Bring the list or the pill bottles to follow-up visits  Carry your medicine list with you in case of an emergency  Follow up with your healthcare provider within 72 hours, or as directed: You may need to return for more tests to find the cause of your chest pain  You may be referred to a specialist, such as a cardiologist or gastroenterologist  Write down your questions so you remember to ask them during your visits  Healthy living tips: The following are general healthy guidelines  If your chest pain is caused by a heart problem, your healthcare provider will give you specific guidelines to follow  · Do not smoke  Nicotine and other chemicals in cigarettes and cigars can cause lung and heart damage  Ask your healthcare provider for information if you currently smoke and need help to quit  E-cigarettes or smokeless tobacco still contain nicotine  Talk to your healthcare provider before you use these products  · Eat a variety of healthy, low-fat foods  Healthy foods include fruits, vegetables, whole-grain breads, low-fat dairy products, beans, lean meats, and fish  Ask for more information about a heart healthy diet  · Ask about activity  Your healthcare provider will tell you which activities to limit or avoid  Ask when you can drive, return to work, and have sex  Ask about the best exercise plan for you  · Maintain a healthy weight  Ask your healthcare provider how much you should weigh  Ask him or her to help you create a weight loss plan if you are overweight       · Get the flu and pneumonia vaccines  All adults should get the influenza (flu) vaccine  Get it every year as soon as it becomes available  The pneumococcal vaccine is given to adults aged 72 years or older  The vaccine is given every 5 years to prevent pneumococcal disease, such as pneumonia  © 2017 [employer] Information is for End User's use only and may not be sold, redistributed or otherwise used for commercial purposes  All illustrations and images included in CareNotes® are the copyrighted property of [employer]  or [employer]  The above information is an  only  It is not intended as medical advice for individual conditions or treatments  Talk to your doctor, nurse or pharmacist before following any medical regimen to see if it is safe and effective for you  Rhinosinusitis   WHAT YOU NEED TO KNOW:   Rhinosinusitis (RS) is inflammation of your nose and sinuses  It commonly begins as a virus, often as a common cold  Viruses usually last 7 to 10 days and do not need treatment  When the virus does not get better on its own, you may have bacterial RS  This means that bacteria have begun to grow inside your sinuses  Acute RS lasts less than 4 weeks  Chronic RS lasts 12 weeks or more  Recurrent RS is when you have 4 or more episodes of RS in one year  DISCHARGE INSTRUCTIONS:   Return to the emergency department if:   · Your eye and eyelid are red, swollen, and painful  · You cannot open your eye  · You have double vision or you cannot see  · Your eyeball bulges out or you cannot move your eye  · You are more sleepy than normal or you notice changes in your ability to think, move, or talk  · You have a stiff neck, a fever, or a bad headache  · You have swelling of your forehead or scalp  Contact your healthcare provider if:   · Your symptoms are worse or do not improve after 3 to 5 days of treatment       · You have questions or concerns about your condition or care  Medicines: You may need any of the following:  · Acetaminophen  decreases pain and fever  It is available without a doctor's order  Ask how much to take and how often to take it  Follow directions  Acetaminophen can cause liver damage if not taken correctly  · NSAIDs , such as ibuprofen, help decrease swelling, pain, and fever  This medicine is available with or without a doctor's order  NSAIDs can cause stomach bleeding or kidney problems in certain people  If you take blood thinner medicine, always ask your healthcare provider if NSAIDs are safe for you  Always read the medicine label and follow directions  · Nasal steroid sprays  decrease inflammation in your nose and sinuses  · Decongestants  reduce swelling and drain mucus in the nose and sinuses  They may help you breathe easier  · Antihistamines  dry mucus in the nose and relieve sneezing  · Antibiotics  treat a bacterial infection and may be needed if your symptoms do not improve or they get worse  · Take your medicine as directed  Contact your healthcare provider if you think your medicine is not helping or if you have side effects  Tell him or her if you are allergic to any medicine  Keep a list of the medicines, vitamins, and herbs you take  Include the amounts, and when and why you take them  Bring the list or the pill bottles to follow-up visits  Carry your medicine list with you in case of an emergency  Self-care:   · Rinse your sinuses  Use a sinus rinse device to rinse your nasal passages with a saline (salt water) solution  This will help thin the mucus in your nose and rinse away pollen and dirt  It will also help reduce swelling so you can breathe normally  Ask your healthcare provider how often to do this  · Breathe in steam   Heat a bowl of water until you see steam  Lean over the bowl and make a tent over your head with a large towel  Breathe deeply for about 20 minutes   Be careful not to get too close to the steam or burn yourself  Do this 3 times a day  You can also breathe deeply when you take a hot shower  · Sleep with your head elevated  Place an extra pillow under your head before you go to sleep to help your sinuses drain  · Drink liquids as directed  Ask your healthcare provider how much liquid to drink each day and which liquids are best for you  Liquids will thin the mucus in your nose and help it drain  Avoid drinks that contain alcohol or caffeine  · Do not smoke, and avoid secondhand smoke  Nicotine and other chemicals in cigarettes and cigars can make your symptoms worse  Ask your healthcare provider for information if you currently smoke and need help to quit  E-cigarettes or smokeless tobacco still contain nicotine  Talk to your healthcare provider before you use these products  Follow up with your healthcare provider as directed: Follow up if your symptoms are worse or not better after 3 to 5 days of treatment  Write down your questions so you remember to ask them during your visits  © 2017 [employer] Information is for End User's use only and may not be sold, redistributed or otherwise used for commercial purposes  All illustrations and images included in CareNotes® are the copyrighted property of [employer]  or [employer]  The above information is an  only  It is not intended as medical advice for individual conditions or treatments  Talk to your doctor, nurse or pharmacist before following any medical regimen to see if it is safe and effective for you

## 2018-01-11 NOTE — CONSULTS
Chief Complaint    1  Elbow Pain    History of Present Illness  HPI: Patient is a 53-year-old female who presents here today for right elbow pain  Patient states this started approximately 3 months ago and has worsened  Patient describes it as being severe enough to affect her activities of daily living  Patient trying to steroid injections for this  Patient states the first one helped however the second did not give her much relief and the pain actually worsened after this  Patient also has tried some physical therapy but had to stop going to this due to pain  The past medical history, surgical history, family history, social history, medications, allergies, and review of systems have been read and reviewed on the chart and have been updated  Review of Systems was recorded in the office today on a separate evaluation sheet and is listed below  Review of Systems    Constitutional: No fever, no chills, feels well, no tiredness, no recent weight gain or loss  Eyes: No complaints of eyesight problems, no red eyes  ENT: no loss of hearing, no nosebleeds, no sore throat  Cardiovascular: No complaints of chest pain, no palpitations, no leg claudication or lower extremity edema  Respiratory: no compliants of shortness of breath, no wheezing, no cough  Gastrointestinal: no complaints of abdominal pain, no constipation, no nausea or diarrhea, no vomiting, no bloody stools  Genitourinary: no complaints of dysuria, no incontinence  Musculoskeletal: as noted in HPI  Integumentary: no complaints of skin rash or lesion, no itching or dry skin, no skin wounds  Neurological: no complaints of headache, no confusion, no numbness or tingling, no dizziness  Endocrine: No complaints of muscle weakness, no feelings of weakness, no frequent urination, no excessive thirst    Psychiatric: No suicidal thoughts, no anxiety, no feelings of depression  Active Problems    1  Abdominal pain (789 00) (R10 9)   2  Anxiety (300 00) (F41 9)   3  Back pain (724 5) (M54 9)   4  Bacterial vaginosis (616 10,041 9) (N76 0,A49 9)   5  Chest pain (786 50) (R07 9)   6  Chest pain on respiration (786 52) (R07 1)   7  Classic migraine with aura (346 00) (G43 109)   8  Current smoker (305 1) (F17 200)   9  Dental infection (522 4) (K04 7)   10  Depression (311) (F32 9)   11  Encounter for screening mammogram for malignant neoplasm of breast (V76 12)    (Z12 31)   12  Essential hypertension (401 9) (I10)   13  Headache (784 0) (R51)   14  Hyperlipidemia (272 4) (E78 5)   15  Lateral epicondylitis of right elbow (726 32) (M77 11)   16  Obesity (278 00) (E66 9)   17  Osteoarthritis (715 90) (M19 90)   18  Overweight (278 02) (E66 3)   19  Radiculopathy (729 2) (M54 10)   20  Sleep apnea (780 57) (G47 30)   21  Snoring (786 09) (R06 83)   22  Strain of thoracic region (847 1) (S29 012A)   23  Urinary calculus (592 9) (N20 9)   24  Vision problem (V41 0) (H54 7)   25   Vitamin D deficiency (268 9) (E55 9)    Past Medical History    · History of Abscess of labia majora (616 4) (N76 4)   · History of folliculitis (V06 8) (I41 6)   · History of urinary frequency (V13 09) (C42 410)   · History of Need for pneumococcal vaccination (V03 82) (Z23)   · History of Need for Tdap vaccination (V06 1) (Z23)   · Personal history of urinary tract infection (V13 02) (Z87 440)   · History of Previous Suicide Attempt   · History of Suicide Attempt (E958 9)    Surgical History    · History of Appendectomy   · History of Cholecystectomy   · History of Hand Surgery   · History of Total Abdominal Hysterectomy    Family History    · Family history of Hypertension (V17 49)   · Family history of Stroke Syndrome (V17 1)    · Family history of Diabetes Mellitus (V18 0)   · Family history of Hypertension (V17 49)    · Family history of Family Health Status Children 2  Living Daughters    Social History    · Always uses seat belt   · Current Every Day Smoker (305 1)   · Current smoker (305 1) (F17 200)   · Denied: History of Drug use   · Marital History -  (V61 03)   · Ex     · Occasional alcohol use   · Unemployed    Current Meds   1  Famotidine 20 MG Oral Tablet; take 1 tablet by mouth twice daily; Therapy: [id number] to (Evaluate:60Uyl6768)  Requested for: 59GGE4700; Last   Rx:2015 Ordered   2  Gabapentin 300 MG Oral Capsule Recorded   3  Percocet 5-325 MG Oral Tablet; Therapy: (Recorded:2015) to Recorded   4  TraMADol HCl - 50 MG Oral Tablet Recorded   5  TraMADol HCl - 50 MG Oral Tablet; TAKE 1 TO 2 TABLETS EVERY 6 HOURS AS NEEDED   FOR PAIN;   Therapy: 37QMK3027 to (Evaluate:53Pox6096); Last Rx:[id number] Ordered    Allergies    1  Sulfa Drugs   2  Cipro TABS   3  Sulfacetamide Sodium OINT    Vitals  Signs [Data Includes: Current Encounter]    Heart Rate: 52CBONKPEY: 220BNDCCVUVR: 77DHRKPA: 5 ft 4 inWeight: 245 lb BMI Calculated: 42 05BSA Calculated: 2 14    Physical Exam      General: No acute distress, age-appropriate  Neck: Supple, trachea midline  HEENT: Normocephalic atraumatic, mucous membranes are moist, sclera are nonicteric  Cardiovascular: No discernable arrhythmia  Respiratory: Breathing is even and unlabored, no stridor or audible wheezing  Psychiatric: Awake alert and oriented x3, normal mood and affect  Abdomen: Without rebound or guarding  R Elbow: No Valgus instability, No Varus Instability, Negative Milking Test and No Olecranon Bursitis (Patient with ttp lateral > medial epicondyle  TTP radiates distally along the extensor muscles  Pt with pain during both passive and active wrist extension/flexion; worst with active resisted wrist extension  FROM of the elbow  Wrist ROM limited 2/2 pain  Neurovascularly intact  )       Results/Data  I personally reviewed the films/images/results in the office today  My interpretation follows  MRI Review MRI shows high grade partial tear of common extensor origin  Assessment    1  Lateral epicondylitis of right elbow (018 32) (M77 11)    Plan     Lateral epicondylitis of right elbow    · You may continue or resume your normal level of activity ; Status:Complete;   Done:  [id number]   · Follow Up After Surgery Evaluation and Treatment  Follow-up  Status: Hold For -  Scheduling  Requested for: [id number]    Discussion/Summary    Patient was seen and examined by Dr Ghanshyam Morrissey and myself in the office today  Together we formulated a diagnosis and treatment plan which is as follows: The anatomy and physiology of lateral epicondylitis was discussed with the patient today  Typically, a traumatic injury or repetitive use may cause a partial or complete tear of the extensor carpi radialis brevis muscle  This creates pain over the lateral epicondyle  This pain typically is made worse with palm down lifting activities as well as anything that involves strength and stability of the wrist  The pain may radiate from the wrist up to the elbow  At times, the shoulder may be weak as well which can predispose or cause continuation of the problem  Conservative treatment usually cures a majority of patients; however, this may take up to 6-9 months  Conservative treatment options typically include activity modification, therapy for strengthening of the shoulder and elbow, nocturnal wrist support splints, tennis elbow straps, and possible corticosteroid injections  Corticosteroid injections do not change the natural history of this process, but may decrease the pain temporarily  Surgery is required in fewer than 10% of patients  At this time patient wishes to proceed directly to surgical correction for this  The risks and benefits of the procedure were explained to the patient, which include, but are not limited to: Bleeding, infection, recurrence, pain, scar, damage to tendons, damage to nerves, and damage to blood vessels, and complications related to anesthesia   These risks, along with alternative conservative treatment options, and postoperative protocols were voiced back and understood by the patient  All questions were answered to the patient's satisfaction  The patient agrees to comply with a standard postoperative protocol, and is willing to proceed  Education was provided via written and auditory forms  There were no barriers to learning  Written handouts regarding wound care, incision and scar care, and general preoperative information was provided to the patient  Prior to surgery, the patient is requested to stop all anti-inflammatory medications  Prophylactic aspirin, Plavix, and Coumadin are allowed to be continued  Medications including vitamin E , ginkgo, and fish oil are requested to be stopped approximately one week prior to surgery  Hypertensive medications and beta blockers, if taken, should be continued         Signatures   Electronically signed by : Sam August, [facility]; Jan 11 2016  5:20PM EST                       (Author)    Electronically signed by : MARLO Britt ; Jan 11 2016  5:26PM EST                       (Author)

## 2018-01-11 NOTE — PROGRESS NOTES
Patient Health Assessment    Date:            07/06/2017  Blood Pressure:  132/95  Pulse:           94  Age:             51  Weight:          250 lbs  Height/Length:   5' 6"  Body Mass Index: 40 2  Provider:        [email or web address]  Clinic:          [facility]      Recall exam, perio assessment, 4 Bwx and 1 PA LR  Medical Alert: pt reports pinched nerve back  Medications: Flagyl 250mg    Peridex 16 oz    Prilosac    Gabapentin    Tramadol--also takes muscle relaxor for back  Allergies:      Sulfa Drugs  Since Last Visit: Medical Alert: No Change    Medications: Change    Allergies:        Change  Pain Scale Type: Numeric Pain ScalePain Level: 0  Description: LR pt has broken tooth/ causing pain/ pt reports not being able to   eat anything hard  perio probed 2-5 mm generalized - will have dr choi for sc/rp  moved to room #5 for Dr Sage Leon exam    ----- Signed on Thursday, July 06, 2017 at 9:16:31 AM  -----  ----- Provider: [id number] - Melody Hough [facility] -- Clinic: [facility] -----

## 2018-01-13 NOTE — CONSULTS
Chief Complaint    1  Elbow Pain    History of Present Illness  HPI: Patient is a 82-year-old female who presents here today for right elbow pain  Patient states this started approximately 3 months ago and has worsened  Patient describes it as being severe enough to affect her activities of daily living  Patient trying to steroid injections for this  Patient states the first one helped however the second did not give her much relief and the pain actually worsened after this  Patient also has tried some physical therapy but had to stop going to this due to pain  The past medical history, surgical history, family history, social history, medications, allergies, and review of systems have been read and reviewed on the chart and have been updated  Review of Systems was recorded in the office today on a separate evaluation sheet and is listed below  Review of Systems  Focused-Female Orthopedics:   Constitutional: No fever, no chills, feels well, no tiredness, no recent weight gain or loss  Eyes: No complaints of eyesight problems, no red eyes  ENT: no loss of hearing, no nosebleeds, no sore throat  Cardiovascular: No complaints of chest pain, no palpitations, no leg claudication or lower extremity edema  Respiratory: no compliants of shortness of breath, no wheezing, no cough  Gastrointestinal: no complaints of abdominal pain, no constipation, no nausea or diarrhea, no vomiting, no bloody stools  Genitourinary: no complaints of dysuria, no incontinence  Musculoskeletal: as noted in HPI  Integumentary: no complaints of skin rash or lesion, no itching or dry skin, no skin wounds  Neurological: no complaints of headache, no confusion, no numbness or tingling, no dizziness  Endocrine: No complaints of muscle weakness, no feelings of weakness, no frequent urination, no excessive thirst    Psychiatric: No suicidal thoughts, no anxiety, no feelings of depression  Active Problems    1  Abdominal pain (789 00) (R10 9)   2  Anxiety (300 00) (F41 9)   3  Back pain (724 5) (M54 9)   4  Bacterial vaginosis (616 10,041 9) (N76 0,A49 9)   5  Chest pain (786 50) (R07 9)   6  Chest pain on respiration (786 52) (R07 1)   7  Classic migraine with aura (346 00) (G43 109)   8  Current smoker (305 1) (F17 200)   9  Dental infection (522 4) (K04 7)   10  Depression (311) (F32 9)   11  Encounter for screening mammogram for malignant neoplasm of breast (V76 12)    (Z12 31)   12  Essential hypertension (401 9) (I10)   13  Headache (784 0) (R51)   14  Hyperlipidemia (272 4) (E78 5)   15  Lateral epicondylitis of right elbow (726 32) (M77 11)   16  Obesity (278 00) (E66 9)   17  Osteoarthritis (715 90) (M19 90)   18  Overweight (278 02) (E66 3)   19  Radiculopathy (729 2) (M54 10)   20  Sleep apnea (780 57) (G47 30)   21  Snoring (786 09) (R06 83)   22  Strain of thoracic region (847 1) (S29 012A)   23  Urinary calculus (592 9) (N20 9)   24  Vision problem (V41 0) (H54 7)   25  Vitamin D deficiency (268 9) (E55 9)    Past Medical History    1  History of Abscess of labia majora (616 4) (N76 4)   2  History of folliculitis (X44 7) (C19 7)   3  History of urinary frequency (V13 09) (Z87 898)   4  History of Need for pneumococcal vaccination (V03 82) (Z23)   5  History of Need for Tdap vaccination (V06 1) (Z23)   6  Personal history of urinary tract infection (V13 02) (Z87 440)   7  History of Previous Suicide Attempt   8  History of Suicide Attempt (L312 7)    Surgical History    1  History of Appendectomy   2  History of Cholecystectomy   3  History of Hand Surgery   4  History of Total Abdominal Hysterectomy    Family History    1  Family history of Hypertension (V17 49)   2  Family history of Stroke Syndrome (V17 1)    3  Family history of Diabetes Mellitus (V18 0)   4  Family history of Hypertension (V17 49)    5   Family history of Family Health Status Children 2  Living Daughters    Social History    · Always uses seat belt   · Current Every Day Smoker (305 1)   · Current smoker (305 1) (F17 200)   · Denied: History of Drug use   · Marital History -  (V61 03)   · Occasional alcohol use   · Unemployed    Current Meds   1  Famotidine 20 MG Oral Tablet; take 1 tablet by mouth twice daily; Therapy: 55ZWF9001 to (Evaluate:36Niy9665)  Requested for: 87SCZ4889; Last   Rx:11Jun2015 Ordered   2  Gabapentin 300 MG Oral Capsule Recorded   3  Percocet 5-325 MG Oral Tablet; Therapy: (Recorded:11Jun2015) to Recorded   4  TraMADol HCl - 50 MG Oral Tablet Recorded   5  TraMADol HCl - 50 MG Oral Tablet; TAKE 1 TO 2 TABLETS EVERY 6 HOURS AS NEEDED   FOR PAIN;   Therapy: 92WPJ5118 to (Evaluate:34Fwb4368); Last Rx:26Rsm5736 Ordered    Allergies    1  Sulfa Drugs   2  Cipro TABS   3  Sulfacetamide Sodium OINT    Vitals  Signs [Data Includes: Current Encounter]   Recorded: 26OMI6413 01:01PM   Heart Rate: 99  Systolic: 617  Diastolic: 93  Height: 5 ft 4 in  Weight: 245 lb   BMI Calculated: 42 05  BSA Calculated: 2 14    Physical Exam      General: No acute distress, age-appropriate  Neck: Supple, trachea midline  HEENT: Normocephalic atraumatic, mucous membranes are moist, sclera are nonicteric  Cardiovascular: No discernable arrhythmia  Respiratory: Breathing is even and unlabored, no stridor or audible wheezing  Psychiatric: Awake alert and oriented x3, normal mood and affect  Abdomen: Without rebound or guarding  R Elbow: No Valgus instability, No Varus Instability, Negative Milking Test and No Olecranon Bursitis (Patient with ttp lateral > medial epicondyle  TTP radiates distally along the extensor muscles  Pt with pain during both passive and active wrist extension/flexion; worst with active resisted wrist extension  FROM of the elbow  Wrist ROM limited 2/2 pain  Neurovascularly intact  )       Results/Data  Diagnostic Studies Reviewed: I personally reviewed the films/images/results in the office today   My interpretation follows  MRI Review MRI shows high grade partial tear of common extensor origin  Assessment    1  Lateral epicondylitis of right elbow (726 32) (M77 11)    Plan  Lateral epicondylitis of right elbow    1  You may continue or resume your normal level of activity ; Status:Complete;   Done:   53CBJ1044   2  Follow Up After Surgery Evaluation and Treatment  Follow-up  Status: Hold For -   Scheduling  Requested for: 35FQT3088    Discussion/Summary  Discussion Summary:   Patient was seen and examined by Dr Kevin Claire and myself in the office today  Together we formulated a diagnosis and treatment plan which is as follows: The anatomy and physiology of lateral epicondylitis was discussed with the patient today  Typically, a traumatic injury or repetitive use may cause a partial or complete tear of the extensor carpi radialis brevis muscle  This creates pain over the lateral epicondyle  This pain typically is made worse with palm down lifting activities as well as anything that involves strength and stability of the wrist  The pain may radiate from the wrist up to the elbow  At times, the shoulder may be weak as well which can predispose or cause continuation of the problem  Conservative treatment usually cures a majority of patients; however, this may take up to 6-9 months  Conservative treatment options typically include activity modification, therapy for strengthening of the shoulder and elbow, nocturnal wrist support splints, tennis elbow straps, and possible corticosteroid injections  Corticosteroid injections do not change the natural history of this process, but may decrease the pain temporarily  Surgery is required in fewer than 10% of patients  At this time patient wishes to proceed directly to surgical correction for this   The risks and benefits of the procedure were explained to the patient, which include, but are not limited to: Bleeding, infection, recurrence, pain, scar, damage to tendons, damage to nerves, and damage to blood vessels, and complications related to anesthesia  These risks, along with alternative conservative treatment options, and postoperative protocols were voiced back and understood by the patient  All questions were answered to the patient's satisfaction  The patient agrees to comply with a standard postoperative protocol, and is willing to proceed  Education was provided via written and auditory forms  There were no barriers to learning  Written handouts regarding wound care, incision and scar care, and general preoperative information was provided to the patient  Prior to surgery, the patient is requested to stop all anti-inflammatory medications  Prophylactic aspirin, Plavix, and Coumadin are allowed to be continued  Medications including vitamin E , ginkgo, and fish oil are requested to be stopped approximately one week prior to surgery  Hypertensive medications and beta blockers, if taken, should be continued         Future Appointments    Signatures   Electronically signed by : Sharon Ley, [facility]; Jan 11 2016  5:20PM EST                       (Author)    Electronically signed by : MARLO Fam ; Jan 11 2016  5:21PM EST                       (Author)    Electronically signed by : MARLO Fam ; Jan 11 2016  5:26PM EST                       (Author)

## 2018-01-14 NOTE — MISCELLANEOUS
Provider Comments  Provider Comments:   2ND NO SHOW FOR NEUROLOGY  NO CALL NO MESSAGE RECEIVED  [facility] MR CALLED AND SPOKE TO PT , PT STATED SHE FORGOT ABOUT HER APPT  RESCHEDULED PT FOR 1/18/17 IN [place] WITH [facility]  2ND NO SHOW LETTER MAILED OUT  Signatures   Electronically signed by :  Chika Thomas, [facility]; Dec  8 2016  8:53AM EST                       (Author)

## 2018-01-16 NOTE — MISCELLANEOUS
Provider Comments  Provider Comments:   No show for Neurology 1/18/17  No calls or messages received  Called and left a voicemail to reschedule the appointment  Letter is sent in the mail  Signatures   Electronically signed by :  Syed Levy, [facility]; Jan 18 2017  3:53PM EST                       (Author)

## 2018-03-19 ENCOUNTER — TRANSCRIBE ORDERS (OUTPATIENT)
Dept: ADMINISTRATIVE | Facility: HOSPITAL | Age: 53
End: 2018-03-19

## 2018-03-19 DIAGNOSIS — M54.12 BRACHIAL NEURITIS: Primary | ICD-10-CM

## 2018-03-26 LAB
ABSOL LYMPHOCYTES (HISTORICAL): 2 K/UL (ref 0.5–4)
ALBUMIN SERPL BCP-MCNC: 3.8 G/DL (ref 3–5.2)
ALP SERPL-CCNC: 95 U/L (ref 43–122)
ALT SERPL W P-5'-P-CCNC: 35 U/L (ref 9–52)
AMORPHOUS MATERIAL (HISTORICAL): ABNORMAL
ANION GAP SERPL CALCULATED.3IONS-SCNC: 11 MMOL/L (ref 5–14)
APTT PPP: 28 SEC (ref 22.7–30.8)
AST SERPL W P-5'-P-CCNC: 28 U/L (ref 14–36)
BACTERIA UR QL AUTO: ABNORMAL
BASOPHILS # BLD AUTO: 0 % (ref 0–1)
BASOPHILS # BLD AUTO: 0 K/UL (ref 0–0.1)
BILIRUB SERPL-MCNC: 0.3 MG/DL
BILIRUB UR QL STRIP: NEGATIVE MG/DL
BUN SERPL-MCNC: 17 MG/DL (ref 5–25)
CALCIUM SERPL-MCNC: 9.3 MG/DL (ref 8.4–10.2)
CASTS/CASTS TYPE (HISTORICAL): ABNORMAL /LPF
CHLORIDE SERPL-SCNC: 109 MEQ/L (ref 97–108)
CHOLEST SERPL-MCNC: 238 MG/DL
CHOLEST/HDLC SERPL: 5.2 {RATIO}
CLARITY UR: CLEAR
CO2 SERPL-SCNC: 21 MMOL/L (ref 22–30)
COLOR UR: YELLOW
CREATINE, SERUM (HISTORICAL): 0.6 MG/DL (ref 0.6–1.2)
CRYSTAL TYPE (HISTORICAL): ABNORMAL /HPF
DEPRECATED RDW RBC AUTO: 13.3 %
EGFR (HISTORICAL): >60 ML/MIN/1.73 M2
EOSINOPHIL # BLD AUTO: 0.1 K/UL (ref 0–0.4)
EOSINOPHIL NFR BLD AUTO: 1 % (ref 0–6)
GLUCOSE FASTING (HISTORICAL): 69 MG/DL (ref 70–99)
GLUCOSE UR STRIP-MCNC: NEGATIVE MG/DL
HCT VFR BLD AUTO: 41.9 % (ref 36–46)
HDLC SERPL-MCNC: 46 MG/DL
HGB BLD-MCNC: 14 G/DL (ref 12–16)
HGB UR QL STRIP.AUTO: ABNORMAL
KETONES UR STRIP-MCNC: NEGATIVE MG/DL
LDL/HDL RATIO (HISTORICAL): 3.5
LDLC SERPL CALC-MCNC: 162 MG/DL
LEUKOCYTE ESTERASE UR QL STRIP: NEGATIVE
LYMPHOCYTES NFR BLD AUTO: 29 % (ref 25–45)
MCH RBC QN AUTO: 32 PG (ref 26–34)
MCHC RBC AUTO-ENTMCNC: 33.4 % (ref 31–36)
MCV RBC AUTO: 96 FL (ref 80–100)
MONOCYTES # BLD AUTO: 0.5 K/UL (ref 0.2–0.9)
MONOCYTES NFR BLD AUTO: 8 % (ref 1–10)
MUCOUS THREADS URNS QL MICRO: ABNORMAL
NEUTROPHILS ABS COUNT (HISTORICAL): 4.3 K/UL (ref 1.8–7.8)
NEUTS SEG NFR BLD AUTO: 62 % (ref 45–65)
NITRITE UR QL STRIP: NEGATIVE
NON-SQ EPI CELLS URNS QL MICRO: ABNORMAL
OTHER STN SPEC: ABNORMAL
PH UR STRIP.AUTO: 5 [PH] (ref 4.5–8)
PLATELET # BLD AUTO: 228 K/MCL (ref 150–450)
POTASSIUM SERPL-SCNC: 4.4 MEQ/L (ref 3.6–5)
PROT UR STRIP-MCNC: NEGATIVE MG/DL
PT - I.N. RATIO (HISTORICAL): 1 RATIO(INR)
PT, PATIENT (HISTORICAL): 9.9 SEC (ref 9.2–11.1)
RBC # BLD AUTO: 4.37 M/MCL (ref 4–5.2)
RBC #/AREA URNS AUTO: ABNORMAL /HPF
SODIUM SERPL-SCNC: 141 MEQ/L (ref 137–147)
SP GR UR STRIP.AUTO: 1.02 (ref 1–1.04)
TOTAL PROTEIN (HISTORICAL): 6.6 G/DL (ref 5.9–8.4)
TRIGL SERPL-MCNC: 152 MG/DL
UROBILINOGEN UR QL STRIP.AUTO: NEGATIVE MG/DL (ref 0–1)
VLDLC SERPL CALC-MCNC: 30 MG/DL (ref 0–40)
WBC # BLD AUTO: 6.8 K/MCL (ref 4.5–11)
WBC #/AREA URNS AUTO: ABNORMAL /HPF

## 2018-04-02 ENCOUNTER — HOSPITAL ENCOUNTER (OUTPATIENT)
Dept: RADIOLOGY | Facility: HOSPITAL | Age: 53
Discharge: HOME | End: 2018-04-02
Payer: COMMERCIAL

## 2018-04-02 DIAGNOSIS — M54.12 BRACHIAL NEURITIS: ICD-10-CM

## 2018-04-02 PROCEDURE — 72141 MRI NECK SPINE W/O DYE: CPT

## 2018-04-04 LAB
ABSOL LYMPHOCYTES (HISTORICAL): 1.2 K/UL (ref 0.5–4)
ALBUMIN SERPL BCP-MCNC: 3.4 G/DL (ref 3–5.2)
ALP SERPL-CCNC: 90 U/L (ref 43–122)
ALT SERPL W P-5'-P-CCNC: 69 U/L (ref 9–52)
ANION GAP SERPL CALCULATED.3IONS-SCNC: 7 MMOL/L (ref 5–14)
AST SERPL W P-5'-P-CCNC: 67 U/L (ref 14–36)
BASOPHILS # BLD AUTO: 0 % (ref 0–1)
BASOPHILS # BLD AUTO: 0 K/UL (ref 0–0.1)
BILIRUB SERPL-MCNC: 0.4 MG/DL
BUN SERPL-MCNC: 11 MG/DL (ref 5–25)
CALCIUM SERPL-MCNC: 8.6 MG/DL (ref 8.4–10.2)
CHLORIDE SERPL-SCNC: 103 MEQ/L (ref 97–108)
CO2 SERPL-SCNC: 26 MMOL/L (ref 22–30)
CREATINE, SERUM (HISTORICAL): 0.63 MG/DL (ref 0.6–1.2)
DEPRECATED RDW RBC AUTO: 13.2 %
EGFR (HISTORICAL): >60 ML/MIN/1.73 M2
EOSINOPHIL # BLD AUTO: 0 K/UL (ref 0–0.4)
EOSINOPHIL NFR BLD AUTO: 0 % (ref 0–6)
GLUCOSE SERPL-MCNC: 114 MG/DL (ref 70–99)
HCT VFR BLD AUTO: 37.8 % (ref 36–46)
HGB BLD-MCNC: 12.5 G/DL (ref 12–16)
LYMPHOCYTES NFR BLD AUTO: 10 % (ref 25–45)
MCH RBC QN AUTO: 31.7 PG (ref 26–34)
MCHC RBC AUTO-ENTMCNC: 33 % (ref 31–36)
MCV RBC AUTO: 96 FL (ref 80–100)
MONOCYTES # BLD AUTO: 1 K/UL (ref 0.2–0.9)
MONOCYTES NFR BLD AUTO: 8 % (ref 1–10)
NEUTROPHILS ABS COUNT (HISTORICAL): 10.7 K/UL (ref 1.8–7.8)
NEUTS SEG NFR BLD AUTO: 82 % (ref 45–65)
PLATELET # BLD AUTO: 197 K/MCL (ref 150–450)
POTASSIUM SERPL-SCNC: 4.5 MEQ/L (ref 3.6–5)
RBC # BLD AUTO: 3.94 M/MCL (ref 4–5.2)
SODIUM SERPL-SCNC: 136 MEQ/L (ref 137–147)
TOTAL PROTEIN (HISTORICAL): 5.8 G/DL (ref 5.9–8.4)
WBC # BLD AUTO: 13 K/MCL (ref 4.5–11)

## 2018-05-10 ENCOUNTER — TRANSCRIBE ORDERS (OUTPATIENT)
Dept: NEUROSURGERY | Facility: CLINIC | Age: 53
End: 2018-05-10

## 2018-05-10 DIAGNOSIS — M54.2 NECK PAIN: Primary | ICD-10-CM

## 2018-05-29 ENCOUNTER — OFFICE VISIT (OUTPATIENT)
Dept: NEUROSURGERY | Facility: CLINIC | Age: 53
End: 2018-05-29
Payer: COMMERCIAL

## 2018-05-29 VITALS
RESPIRATION RATE: 16 BRPM | BODY MASS INDEX: 35.36 KG/M2 | HEART RATE: 88 BPM | TEMPERATURE: 97.8 F | WEIGHT: 220 LBS | DIASTOLIC BLOOD PRESSURE: 90 MMHG | HEIGHT: 66 IN | SYSTOLIC BLOOD PRESSURE: 132 MMHG

## 2018-05-29 DIAGNOSIS — M54.12 RADICULOPATHY, CERVICAL: ICD-10-CM

## 2018-05-29 DIAGNOSIS — M54.2 CERVICALGIA: Primary | ICD-10-CM

## 2018-05-29 DIAGNOSIS — M54.2 NECK PAIN: ICD-10-CM

## 2018-05-29 DIAGNOSIS — G89.4 CHRONIC PAIN SYNDROME: ICD-10-CM

## 2018-05-29 DIAGNOSIS — M54.81 OCCIPITAL NEURALGIA OF LEFT SIDE: ICD-10-CM

## 2018-05-29 PROCEDURE — 99214 OFFICE O/P EST MOD 30 MIN: CPT | Performed by: PHYSICIAN ASSISTANT

## 2018-05-29 RX ORDER — FAMOTIDINE 20 MG/1
TABLET, FILM COATED ORAL
COMMUNITY
End: 2018-07-12 | Stop reason: ALTCHOICE

## 2018-05-29 RX ORDER — CLONAZEPAM 0.5 MG/1
TABLET ORAL
COMMUNITY
End: 2019-11-11 | Stop reason: ALTCHOICE

## 2018-05-29 RX ORDER — HYDROCODONE BITARTRATE AND ACETAMINOPHEN 5; 325 MG/1; MG/1
TABLET ORAL
COMMUNITY
End: 2018-08-09 | Stop reason: ALTCHOICE

## 2018-05-29 RX ORDER — OXYCODONE HYDROCHLORIDE AND ACETAMINOPHEN 5; 325 MG/1; MG/1
1 TABLET ORAL EVERY 8 HOURS PRN
Refills: 0 | COMMUNITY
Start: 2018-04-11

## 2018-05-29 RX ORDER — PANTOPRAZOLE SODIUM 40 MG/1
TABLET, DELAYED RELEASE ORAL
COMMUNITY
Start: 2018-05-27 | End: 2018-06-24 | Stop reason: SDUPTHER
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
## 2018-05-31 ENCOUNTER — TELEPHONE (OUTPATIENT)
Dept: NEUROLOGY | Facility: CLINIC | Age: 53
End: 2018-05-31

## 2018-06-24 DIAGNOSIS — Z98.84 BARIATRIC SURGERY STATUS: Primary | ICD-10-CM

## 2018-06-27 RX ORDER — PANTOPRAZOLE SODIUM 40 MG/1
TABLET, DELAYED RELEASE ORAL
Qty: 30 TABLET | Refills: 2 | Status: SHIPPED | OUTPATIENT
Start: 2018-06-27 | End: 2019-06-20 | Stop reason: ALTCHOICE

## 2018-07-12 ENCOUNTER — HOSPITAL ENCOUNTER (OUTPATIENT)
Dept: INTERVENTIONAL RADIOLOGY/VASCULAR | Facility: HOSPITAL | Age: 53
Discharge: HOME | End: 2018-07-12
Payer: COMMERCIAL

## 2018-07-12 VITALS
WEIGHT: 170 LBS | BODY MASS INDEX: 27.32 KG/M2 | HEART RATE: 60 BPM | SYSTOLIC BLOOD PRESSURE: 126 MMHG | HEIGHT: 66 IN | RESPIRATION RATE: 18 BRPM | TEMPERATURE: 97.1 F | DIASTOLIC BLOOD PRESSURE: 62 MMHG | OXYGEN SATURATION: 98 %

## 2018-07-12 DIAGNOSIS — M51.16 NEURITIS OR RADICULITIS DUE TO RUPTURE OF LUMBAR INTERVERTEBRAL DISC: ICD-10-CM

## 2018-07-12 PROCEDURE — 99153 MOD SED SAME PHYS/QHP EA: CPT

## 2018-07-12 PROCEDURE — 99152 MOD SED SAME PHYS/QHP 5/>YRS: CPT

## 2018-07-12 RX ORDER — FENTANYL CITRATE 50 UG/ML
INJECTION, SOLUTION INTRAMUSCULAR; INTRAVENOUS CODE/TRAUMA/SEDATION MEDICATION
Status: COMPLETED | OUTPATIENT
Start: 2018-07-12 | End: 2018-07-12

## 2018-07-12 RX ORDER — PAPAVERINE HCL 150 MG
CAPSULE, EXTENDED RELEASE ORAL CODE/TRAUMA/SEDATION MEDICATION
Status: COMPLETED | OUTPATIENT
Start: 2018-07-12 | End: 2018-07-12

## 2018-07-12 RX ORDER — TIZANIDINE HYDROCHLORIDE 2 MG/1
CAPSULE, GELATIN COATED ORAL 3 TIMES DAILY
COMMUNITY

## 2018-07-12 RX ORDER — OMEPRAZOLE 10 MG/1
CAPSULE, DELAYED RELEASE ORAL DAILY
COMMUNITY
End: 2022-03-24

## 2018-07-12 RX ORDER — MIDAZOLAM HYDROCHLORIDE 1 MG/ML
INJECTION INTRAMUSCULAR; INTRAVENOUS CODE/TRAUMA/SEDATION MEDICATION
Status: COMPLETED | OUTPATIENT
Start: 2018-07-12 | End: 2018-07-12

## 2018-07-12 RX ORDER — KETOROLAC TROMETHAMINE 30 MG/ML
INJECTION, SOLUTION INTRAMUSCULAR; INTRAVENOUS CODE/TRAUMA/SEDATION MEDICATION
Status: COMPLETED | OUTPATIENT
Start: 2018-07-12 | End: 2018-07-12

## 2018-07-12 RX ORDER — SODIUM CHLORIDE 9 MG/ML
50 INJECTION, SOLUTION INTRAVENOUS CONTINUOUS
Status: DISCONTINUED | OUTPATIENT
Start: 2018-07-12 | End: 2018-07-16 | Stop reason: HOSPADM

## 2018-07-12 RX ORDER — LIDOCAINE HYDROCHLORIDE 10 MG/ML
INJECTION, SOLUTION EPIDURAL; INFILTRATION; INTRACAUDAL; PERINEURAL CODE/TRAUMA/SEDATION MEDICATION
Status: COMPLETED | OUTPATIENT
Start: 2018-07-12 | End: 2018-07-12

## 2018-07-12 RX ADMIN — FENTANYL CITRATE 25 MCG: 50 INJECTION INTRAMUSCULAR; INTRAVENOUS at 11:18

## 2018-07-12 RX ADMIN — IOHEXOL 2 ML: 300 INJECTION, SOLUTION INTRAVENOUS at 11:27

## 2018-07-12 RX ADMIN — DEXAMETHASONE SODIUM PHOSPHATE 10 MG: 10 INJECTION, SOLUTION INTRAMUSCULAR; INTRAVENOUS at 11:12

## 2018-07-12 RX ADMIN — LIDOCAINE HYDROCHLORIDE 7 ML: 10 INJECTION, SOLUTION EPIDURAL; INFILTRATION; INTRACAUDAL; PERINEURAL at 11:11

## 2018-07-12 RX ADMIN — MIDAZOLAM HYDROCHLORIDE 2 MG: 1 INJECTION, SOLUTION INTRAMUSCULAR; INTRAVENOUS at 11:11

## 2018-07-12 RX ADMIN — MIDAZOLAM HYDROCHLORIDE 2 MG: 1 INJECTION, SOLUTION INTRAMUSCULAR; INTRAVENOUS at 11:18

## 2018-07-12 RX ADMIN — KETOROLAC TROMETHAMINE 30 MG: 30 INJECTION, SOLUTION INTRAMUSCULAR; INTRAVENOUS at 11:11

## 2018-07-12 RX ADMIN — SODIUM BICARBONATE 1.5 MEQ: 84 INJECTION PARENTERAL at 11:12

## 2018-07-12 RX ADMIN — SODIUM CHLORIDE 50 ML/HR: 0.9 INJECTION, SOLUTION INTRAVENOUS at 10:00

## 2018-07-12 RX ADMIN — FENTANYL CITRATE 50 MCG: 50 INJECTION INTRAMUSCULAR; INTRAVENOUS at 11:11

## 2018-07-12 NOTE — DISCHARGE INSTRUCTIONS
Epidural Steroid Injection   AMBULATORY CARE:   What you need to know about an epidural steroid injection (ROCIO):  An ROCIO is a procedure to inject steroid medicine into the epidural space  The epidural space is between your spinal cord and vertebrae  Steroids reduce inflammation and fluid buildup in your spine that may be causing pain  You may be given pain medicine along with the steroids  How to prepare for an ROCIO:  Your healthcare provider will talk to you about how to prepare for your procedure  He will tell you what medicines to take or not take on the day of your procedure  You may need to stop taking blood thinners or other medicines several days before your procedure  You may need to adjust any diabetes medicine you take on the day of your procedure  Steroid medicine can increase your blood sugar level  What will happen during an ROCIO:   · You will be given medicine to numb the procedure area  You will be awake for the procedure, but you will not feel pain  You may also be given medicine to help you relax during the procedure  Contrast liquid will be used to help your healthcare provider see the area better  Tell the healthcare provider if you have ever had an allergic reaction to contrast liquid  · Your healthcare provider may place the needle into your neck area, middle of your back, or tailbone area  He may inject the medicine next to the nerves that are causing your pain  He may instead inject the medicine into a larger area of the epidural space  This helps the medicine spread to more nerves  Your healthcare provider will use a fluoroscope to help guide the needle to the right place  A fluoroscope is a type of x-ray  After the procedure, a bandage will be placed over the injection site to prevent infection  Risks of an ROCIO:  You may have temporary or permanent nerve damage or paralysis  You may have bleeding or develop a serious infection, such as meningitis (swelling of the brain coverings)  An abscess may also develop  You may need surgery to fix the abscess  You may have a seizure, anxiety, or trouble sleeping  If you are a man, you may have temporary erectile dysfunction (not able to have an erection)  Care for your wound as directed: You may remove the bandage before you go to bed the day of your procedure  You may take a shower, but do not take a bath for at least 24 hours  Self-care:   · Do not drive,  use machines, or do strenuous activity for 24 hours after your procedure or as directed  · Continue other treatments  as directed  Steroid injections alone will not control your pain  The injections are meant to be used with other treatments, such as physical therapy  Seek care immediately if:   · Blood soaks through your bandage  · Your wound is red, swollen, or draining pus  · You have a fever or chills, severe back pain, and the procedure area is sensitive to the touch  · You have a seizure  · You have trouble moving your legs  · You have weakness or numbness in your legs  · You cannot control when you urinate or have a bowel movement  Contact your healthcare provider if:   · You have nausea or are vomiting  · Your face or neck is red for a few days and you feel warm  · You have more pain than you had before the procedure  · You have swelling in your hands or feet  · You have questions or concerns about your condition or care  Follow up with your healthcare provider as directed:  Write down your questions so you remember to ask them during your visits  © 2017 [employer] [employer] Information is for End User's use only and may not be sold, redistributed or otherwise used for commercial purposes  All illustrations and images included in CareNotes® are the copyrighted property of [employer]  or [employer]on Lindsay  The above information is an  only  It is not intended as medical advice for individual conditions or treatments  Talk to your doctor, nurse or pharmacist before following any medical regimen to see if it is safe and effective for you

## 2018-07-12 NOTE — PROCEDURES
ATTENDING PHYSICIAN:  Feliz Lima MD     PROCEDURE:  1  Left L5 transforaminal epidural steroid injection under fluoroscopic guidance  2  Right L5 transforaminal epidural steroid injection under fluoroscopic guidance  PRE-PROCEDURE DIAGNOSIS:  Bilateral lumbar radiculopathy, lumbar bulging disc, low back pain  POST-PROCEDURE DIAGNOSIS: Same    ANESTHESIA:  Local and Conscious Sedation    ESTIMATED BLOOD LOSS:  Minimal     COMPLICATIONS:  None  LOCATION:  [facility]    CONSENT:  Today's procedure, its potential benefits as well as its risks and potential side effects were reviewed  Discussed risks of the procedure including bleeding, infection, nerve irritation or damage, reactions to the medications, weakness, headache, failure of the pain to improve, and potential worsening of the pain were explained to the patient who verbalized understanding and who wished to proceed  Written informed consent was thereby obtained  DESCRIPTION OF THE PROCEDURE:  After written informed consent was obtained, the patient was taken to the fluoroscopy suite and placed in the prone position  Anatomical landmarks were identified by way of fluoroscopy in multiple views  The skin of the lumbar region was prepped using antiseptic and draped in the usual sterile fashion  Strict aseptic technique was utilized  The skin and subcutaneous tissues at the needle entry site were infiltrated with a total of 5 mL of 1% preservative-free lidocaine using a 25-gauge 1-1/2-inch needle  22-gauge needles were then incrementally advanced under fluoroscopic guidance in the oblique view into the neural foramina as mentioned above  Proper placement into each of the neural foramen was confirmed with fluoroscopy in both the lateral and AP views  After negative aspiration for CSF or heme, contrast was injected, which delineated the nerve roots and the epidural space under fluoroscopy in the AP view   There was only a transient pressure paresthesia that resolved immediately upon injection  After negative aspiration, a 2 mL of a 4 mL injectate consisting of 3 mL of preservative-free 0 25% bupivacaine and 1 mL of Depo-Medrol 80 mg/mL was slowly injected into each of the needles as delineated above  The patient tolerated the procedure well and all needles were removed intact  Hemostasis was maintained  There were no apparent paresthesias or complications  The skin was wiped clean and a Band-Aid was placed as appropriate  The patient was monitored for an appropriate period of time and remained hemodynamically stable following the procedure  The patient was ultimately discharged to home with supervision in good condition and instructed to call the office in approximately 7-10 days with an update or sooner as warranted  Discharge instructions were provided  I was present for and participated in all key and critical portions of this procedure      Rudolpho Bamberger, MD  7/12/2018  11:33 AM

## 2018-07-12 NOTE — H&P
History of Present Illness: The patient is a 50 y o  female who presents with complaints of back and leg pain    Patient Active Problem List   Diagnosis   (none) - all problems resolved or deleted       Past Medical History:   Diagnosis Date    Anxiety     Depression     h/o suicide attempt    Hypertension     Nephrolithiasis     h/o    Osteoarthritis     Radiculopathy     Vitamin D deficiency        Past Surgical History:   Procedure Laterality Date    ABDOMINAL HYSTERECTOMY      UMBERTO & BSO d/t/ endometriosis    APPENDECTOMY      CARPAL TUNNEL RELEASE Bilateral     CHOLECYSTECTOMY      ELBOW SURGERY Left 2010    ELBOW SURGERY Right     HEMORROIDECTOMY      LATERAL EPICONDYLE RELEASE Right 1/25/2016    Procedure: RIGHT ELBOW TENNIS ELBOW RELEASE;  Surgeon: German Hurt MD;  [facility];  Service:     OH CORRJ HALLUX VALGUS W/SESMDC W/DIST METAR OSTEOT Right 4/8/2016    Procedure: Otilia Bedolla;  Surgeon: Adriel Mccracken DPM;  [facility];  Service: Podiatry    OH PART EXCIS 5TH METATARSAL HEAD Right 4/8/2016    Procedure: Feliciat Grover;  Surgeon: Adriel Mccracken DPM;  [facility];  Service: Podiatry    SHOULDER ARTHROSCOPY Right          Current Outpatient Prescriptions:     clonazePAM (KlonoPIN) 0 5 mg tablet, clonazepam 0 5 mg tablet, Disp: , Rfl:     gabapentin (NEURONTIN) 300 mg capsule, Take 300 mg by mouth 3 (three) times a day , Disp: , Rfl:     HYDROcodone-acetaminophen (NORCO) 5-325 mg per tablet, hydrocodone 5 mg-acetaminophen 325 mg tablet, Disp: , Rfl:     omeprazole (PriLOSEC) 10 mg delayed release capsule, Take by mouth daily 1 tablet as needed ( pt unsure of dose)  , Disp: , Rfl:     pantoprazole (PROTONIX) 40 mg tablet, take 1 tablet by mouth once daily, Disp: 30 tablet, Rfl: 2    TiZANidine (ZANAFLEX) 2 MG capsule, Take by mouth 3 (three) times a day 1 Tablet at hs   ( Pt unsure of dose), Disp: , Rfl:     Zolpidem Tartrate (AMBIEN PO), Take by mouth daily at bedtime as needed  , Disp: , Rfl:     albuterol (PROVENTIL HFA,VENTOLIN HFA) 90 mcg/act inhaler, Inhale 2 puffs every 4 (four) hours as needed for wheezing or shortness of breath, Disp: 1 Inhaler, Rfl: 0    naproxen (NAPROSYN) 500 mg tablet, Take 1 tablet by mouth 2 (two) times a day with meals for 30 days, Disp: 60 tablet, Rfl: 0    oxyCODONE-acetaminophen (PERCOCET) 5-325 mg per tablet, Take 1 tablet by mouth every 8 (eight) hours as needed, Disp: , Rfl: 0    verapamil (CALAN) 80 mg tablet, Take 80 mg by mouth 3 (three) times a day  Pt reports, stopped on her own as she read it was recalled  Advised to notify her PCP  Pt stating "I only take this when my blood pressure is elevated or have a headache" , Disp: , Rfl:     Current Facility-Administered Medications:     sodium chloride 0 9 % infusion, 50 mL/hr, Intravenous, Continuous, Kem Nicole MD, Last Rate: 50 mL/hr at 07/12/18 1000, 50 mL/hr at 07/12/18 1000    Allergies   Allergen Reactions    Ciprofloxacin Other (See Comments)     Mouth swells, angioedema and blister    Morphine And Related GI Intolerance    Sulfa Antibiotics Swelling     Oral angioedema and blister       Physical Exam:   Vitals:    07/12/18 0937   BP: 133/65   Pulse: 65   Resp: 20   Temp: 97 6 °F (36 4 °C)   SpO2: 96%     General: Awake, Alert, Oriented x 3, Mood and affect appropriate  Respiratory: Respirations even and unlabored  Cardiovascular: Peripheral pulses intact; no edema  Musculoskeletal Exam:  Limited lumbar range of motion, positive straight leg raise bilaterally       Assessment:   1   Neuritis or radiculitis due to rupture of lumbar intervertebral disc        Plan:  Bilateral L5 transforaminal epidural steroid injections

## 2018-08-08 ENCOUNTER — TELEPHONE (OUTPATIENT)
Dept: SURGERY | Facility: HOSPITAL | Age: 53
End: 2018-08-08

## 2018-08-09 ENCOUNTER — HOSPITAL ENCOUNTER (OUTPATIENT)
Dept: INTERVENTIONAL RADIOLOGY/VASCULAR | Facility: HOSPITAL | Age: 53
Discharge: HOME | End: 2018-08-09
Admitting: PHYSICAL MEDICINE & REHABILITATION
Payer: COMMERCIAL

## 2018-08-09 VITALS
HEIGHT: 66 IN | BODY MASS INDEX: 28.93 KG/M2 | TEMPERATURE: 97.2 F | SYSTOLIC BLOOD PRESSURE: 119 MMHG | HEART RATE: 65 BPM | WEIGHT: 180 LBS | DIASTOLIC BLOOD PRESSURE: 60 MMHG | OXYGEN SATURATION: 100 % | RESPIRATION RATE: 16 BRPM

## 2018-08-09 DIAGNOSIS — M54.2 NECK PAIN: ICD-10-CM

## 2018-08-09 DIAGNOSIS — M54.12 CERVICAL RADICULITIS: ICD-10-CM

## 2018-08-09 PROCEDURE — 99153 MOD SED SAME PHYS/QHP EA: CPT

## 2018-08-09 PROCEDURE — 99152 MOD SED SAME PHYS/QHP 5/>YRS: CPT

## 2018-08-09 RX ORDER — SODIUM CHLORIDE 9 MG/ML
50 INJECTION, SOLUTION INTRAVENOUS CONTINUOUS
Status: DISCONTINUED | OUTPATIENT
Start: 2018-08-09 | End: 2018-08-13 | Stop reason: HOSPADM

## 2018-08-09 RX ORDER — FENTANYL CITRATE 50 UG/ML
INJECTION, SOLUTION INTRAMUSCULAR; INTRAVENOUS CODE/TRAUMA/SEDATION MEDICATION
Status: COMPLETED | OUTPATIENT
Start: 2018-08-09 | End: 2018-08-09

## 2018-08-09 RX ORDER — KETOROLAC TROMETHAMINE 30 MG/ML
INJECTION, SOLUTION INTRAMUSCULAR; INTRAVENOUS CODE/TRAUMA/SEDATION MEDICATION
Status: COMPLETED | OUTPATIENT
Start: 2018-08-09 | End: 2018-08-09

## 2018-08-09 RX ORDER — PAPAVERINE HCL 150 MG
CAPSULE, EXTENDED RELEASE ORAL CODE/TRAUMA/SEDATION MEDICATION
Status: COMPLETED | OUTPATIENT
Start: 2018-08-09 | End: 2018-08-09

## 2018-08-09 RX ORDER — LIDOCAINE HYDROCHLORIDE 10 MG/ML
INJECTION, SOLUTION EPIDURAL; INFILTRATION; INTRACAUDAL; PERINEURAL CODE/TRAUMA/SEDATION MEDICATION
Status: COMPLETED | OUTPATIENT
Start: 2018-08-09 | End: 2018-08-09

## 2018-08-09 RX ORDER — MIDAZOLAM HYDROCHLORIDE 1 MG/ML
INJECTION INTRAMUSCULAR; INTRAVENOUS CODE/TRAUMA/SEDATION MEDICATION
Status: COMPLETED | OUTPATIENT
Start: 2018-08-09 | End: 2018-08-09

## 2018-08-09 RX ADMIN — KETOROLAC TROMETHAMINE 30 MG: 30 INJECTION, SOLUTION INTRAMUSCULAR; INTRAVENOUS at 10:31

## 2018-08-09 RX ADMIN — FENTANYL CITRATE 50 MCG: 50 INJECTION INTRAMUSCULAR; INTRAVENOUS at 10:31

## 2018-08-09 RX ADMIN — MIDAZOLAM HYDROCHLORIDE 2 MG: 1 INJECTION, SOLUTION INTRAMUSCULAR; INTRAVENOUS at 10:36

## 2018-08-09 RX ADMIN — DEXAMETHASONE SODIUM PHOSPHATE 20 MG: 10 INJECTION, SOLUTION INTRAMUSCULAR; INTRAVENOUS at 10:57

## 2018-08-09 RX ADMIN — IOHEXOL 3 ML: 300 INJECTION, SOLUTION INTRAVENOUS at 10:57

## 2018-08-09 RX ADMIN — MIDAZOLAM HYDROCHLORIDE 2 MG: 1 INJECTION, SOLUTION INTRAMUSCULAR; INTRAVENOUS at 10:31

## 2018-08-09 RX ADMIN — FENTANYL CITRATE 25 MCG: 50 INJECTION INTRAMUSCULAR; INTRAVENOUS at 10:36

## 2018-08-09 RX ADMIN — SODIUM CHLORIDE 50 ML/HR: 9 INJECTION, SOLUTION INTRAVENOUS at 09:39

## 2018-08-09 RX ADMIN — LIDOCAINE HYDROCHLORIDE 5 ML: 10 INJECTION, SOLUTION EPIDURAL; INFILTRATION; INTRACAUDAL; PERINEURAL at 10:56

## 2018-08-09 NOTE — H&P
History of Present Illness: The patient is a 50 y o  female who presents with complaints of left cervical radiculopathy, left neck pain, cervical bulging disc    Patient Active Problem List   Diagnosis   (none) - all problems resolved or deleted       Past Medical History:   Diagnosis Date    Anxiety     Depression     h/o suicide attempt    Hypertension     Nephrolithiasis     h/o    Osteoarthritis     Radiculopathy     Vitamin D deficiency        Past Surgical History:   Procedure Laterality Date    ABDOMINAL HYSTERECTOMY      UMBERTO & BSO d/t/ endometriosis    APPENDECTOMY      CARPAL TUNNEL RELEASE Bilateral     CHOLECYSTECTOMY      ELBOW SURGERY Left 2010    ELBOW SURGERY Right     HEMORROIDECTOMY      LATERAL EPICONDYLE RELEASE Right 1/25/2016    Procedure: RIGHT ELBOW TENNIS ELBOW RELEASE;  Surgeon: Yocasta Lacy MD;  [facility];  Service:     [place] W/SESMDC W/DIST Talia Furlough Right 4/8/2016    Procedure: Suzy Browning;  Surgeon: Miryam Hoffman DPM;  [facility];  Service: Podiatry    [place] PART EXCIS 5TH METATARSAL HEAD Right 4/8/2016    Procedure: Luis Starkey;  Surgeon: Miryam Hoffman DPM;  [facility];  Service: Podiatry    SHOULDER ARTHROSCOPY Right          Current Outpatient Prescriptions:     gabapentin (NEURONTIN) 300 mg capsule, Take 300 mg by mouth 3 (three) times a day , Disp: , Rfl:     omeprazole (PriLOSEC) 10 mg delayed release capsule, Take by mouth daily 1 tablet as needed ( pt unsure of dose)  , Disp: , Rfl:     oxyCODONE-acetaminophen (PERCOCET) 5-325 mg per tablet, Take 1 tablet by mouth every 8 (eight) hours as needed, Disp: , Rfl: 0    pantoprazole (PROTONIX) 40 mg tablet, take 1 tablet by mouth once daily, Disp: 30 tablet, Rfl: 2    TiZANidine (ZANAFLEX) 2 MG capsule, Take by mouth 3 (three) times a day 1 Tablet at hs   ( Pt unsure of dose), Disp: , Rfl:     Zolpidem Tartrate (AMBIEN PO), Take by mouth daily at bedtime as needed  , Disp: , Rfl:     albuterol (PROVENTIL HFA,VENTOLIN HFA) 90 mcg/act inhaler, Inhale 2 puffs every 4 (four) hours as needed for wheezing or shortness of breath, Disp: 1 Inhaler, Rfl: 0    clonazePAM (KlonoPIN) 0 5 mg tablet, clonazepam 0 5 mg tablet, Disp: , Rfl:     Current Facility-Administered Medications:     sodium chloride 0 9 % infusion, 50 mL/hr, Intravenous, Continuous, Kem Nicole MD, Stopped at 08/09/18 1129    Allergies   Allergen Reactions    Ciprofloxacin Other (See Comments)     Mouth swells, angioedema and blister    Morphine And Related GI Intolerance    Sulfa Antibiotics Swelling     Oral angioedema and blister       Physical Exam:   Vitals:    08/09/18 1152   BP: 119/60   Pulse: 65   Resp: 16   Temp:    SpO2:      General: Awake, Alert, Oriented x 3, Mood and affect appropriate  Respiratory: Respirations even and unlabored  Cardiovascular: Peripheral pulses intact; no edema  Musculoskeletal Exam:  Positive Spurling's test left side, limited cervical range of motion        Assessment:   1  Cervical radiculitis    2   Neck pain        Plan:   Lef C5 and C6 cervical epidural steroid injections tPlan:

## 2018-08-09 NOTE — PERIOPERATIVE NURSING NOTE
Returned from cath lab sleepy but arouseable  ivs running  Denies pain  bandaids dry and intact  No s/s of hematoma    Eating

## 2018-09-14 DIAGNOSIS — Z98.84 BARIATRIC SURGERY STATUS: ICD-10-CM

## 2018-09-25 ENCOUNTER — TELEPHONE (OUTPATIENT)
Dept: BARIATRICS | Facility: CLINIC | Age: 53
End: 2018-09-25

## 2018-09-25 NOTE — TELEPHONE ENCOUNTER
Med refills     Dr Heaven Rodriguez pt      Pharmacy called left voice mail for protonix 40mg    [facility] [phone]

## 2018-10-02 PROBLEM — Z72.0 TOBACCO USER: Status: ACTIVE | Noted: 2018-10-02

## 2018-10-02 PROBLEM — Z98.84 S/P GASTRIC BYPASS: Status: ACTIVE | Noted: 2018-10-02

## 2018-10-02 PROBLEM — E78.00 HYPERCHOLESTEROLEMIA: Status: ACTIVE | Noted: 2017-03-09

## 2018-10-02 PROBLEM — K91.2 POSTSURGICAL MALABSORPTION: Status: ACTIVE | Noted: 2018-10-02

## 2018-10-03 ENCOUNTER — TELEPHONE (OUTPATIENT)
Dept: BARIATRICS | Facility: CLINIC | Age: 53
End: 2018-10-03

## 2018-10-26 ENCOUNTER — TELEPHONE (OUTPATIENT)
Dept: BARIATRICS | Facility: CLINIC | Age: 53
End: 2018-10-26

## 2018-12-12 RX ORDER — PANTOPRAZOLE SODIUM 40 MG/1
TABLET, DELAYED RELEASE ORAL
Qty: 30 TABLET | Refills: 1 | OUTPATIENT
Start: 2018-12-12

## 2018-12-12 NOTE — TELEPHONE ENCOUNTER
William Acosta, can you make a note to the pharmacy that we have tried to reach patient to advise of f/up care for refill and number has changed, Kavya sent her a letter back in October with no response

## 2018-12-12 NOTE — TELEPHONE ENCOUNTER
Dr Ladonna Kelly is no longer with [facility]   If prescription is still warranted will need to be seen in office by new Bariatric Team

## 2019-01-16 ENCOUNTER — TELEPHONE (OUTPATIENT)
Dept: SURGERY | Facility: HOSPITAL | Age: 54
End: 2019-01-16

## 2019-01-17 ENCOUNTER — HOSPITAL ENCOUNTER (OUTPATIENT)
Dept: INTERVENTIONAL RADIOLOGY/VASCULAR | Facility: HOSPITAL | Age: 54
Discharge: HOME | End: 2019-01-17
Admitting: PHYSICAL MEDICINE & REHABILITATION
Payer: COMMERCIAL

## 2019-01-17 VITALS
SYSTOLIC BLOOD PRESSURE: 110 MMHG | TEMPERATURE: 97.7 F | HEIGHT: 66 IN | WEIGHT: 130 LBS | DIASTOLIC BLOOD PRESSURE: 64 MMHG | BODY MASS INDEX: 20.89 KG/M2 | HEART RATE: 62 BPM | RESPIRATION RATE: 18 BRPM | OXYGEN SATURATION: 98 %

## 2019-01-17 DIAGNOSIS — M99.53 INTERVERTEBRAL DISC STENOSIS OF NEURAL CANAL OF LUMBAR REGION: ICD-10-CM

## 2019-01-17 DIAGNOSIS — M51.16 NEURITIS OR RADICULITIS DUE TO RUPTURE OF LUMBAR INTERVERTEBRAL DISC: ICD-10-CM

## 2019-01-17 PROCEDURE — 99152 MOD SED SAME PHYS/QHP 5/>YRS: CPT

## 2019-01-17 RX ORDER — CLINDAMYCIN PHOSPHATE 600 MG/50ML
600 INJECTION INTRAVENOUS ONCE
Status: CANCELLED | OUTPATIENT
Start: 2019-01-17

## 2019-01-17 RX ORDER — CLINDAMYCIN PHOSPHATE 600 MG/50ML
600 INJECTION INTRAVENOUS ONCE
Status: COMPLETED | OUTPATIENT
Start: 2019-01-17 | End: 2019-01-17

## 2019-01-17 RX ORDER — SODIUM CHLORIDE 9 MG/ML
50 INJECTION, SOLUTION INTRAVENOUS CONTINUOUS
Status: DISCONTINUED | OUTPATIENT
Start: 2019-01-17 | End: 2019-01-21 | Stop reason: HOSPADM

## 2019-01-17 RX ORDER — FENTANYL CITRATE 50 UG/ML
INJECTION, SOLUTION INTRAMUSCULAR; INTRAVENOUS CODE/TRAUMA/SEDATION MEDICATION
Status: COMPLETED | OUTPATIENT
Start: 2019-01-17 | End: 2019-01-17

## 2019-01-17 RX ORDER — KETOROLAC TROMETHAMINE 30 MG/ML
INJECTION, SOLUTION INTRAMUSCULAR; INTRAVENOUS CODE/TRAUMA/SEDATION MEDICATION
Status: COMPLETED | OUTPATIENT
Start: 2019-01-17 | End: 2019-01-17

## 2019-01-17 RX ORDER — LIDOCAINE HYDROCHLORIDE 10 MG/ML
INJECTION, SOLUTION EPIDURAL; INFILTRATION; INTRACAUDAL; PERINEURAL CODE/TRAUMA/SEDATION MEDICATION
Status: COMPLETED | OUTPATIENT
Start: 2019-01-17 | End: 2019-01-17

## 2019-01-17 RX ORDER — SODIUM CHLORIDE 9 MG/ML
50 INJECTION, SOLUTION INTRAVENOUS CONTINUOUS
Status: CANCELLED | OUTPATIENT
Start: 2019-01-17

## 2019-01-17 RX ORDER — MIDAZOLAM HYDROCHLORIDE 1 MG/ML
INJECTION INTRAMUSCULAR; INTRAVENOUS CODE/TRAUMA/SEDATION MEDICATION
Status: COMPLETED | OUTPATIENT
Start: 2019-01-17 | End: 2019-01-17

## 2019-01-17 RX ORDER — PAPAVERINE HCL 150 MG
CAPSULE, EXTENDED RELEASE ORAL CODE/TRAUMA/SEDATION MEDICATION
Status: COMPLETED | OUTPATIENT
Start: 2019-01-17 | End: 2019-01-17

## 2019-01-17 RX ADMIN — MIDAZOLAM HYDROCHLORIDE 2 MG: 1 INJECTION, SOLUTION INTRAMUSCULAR; INTRAVENOUS at 12:37

## 2019-01-17 RX ADMIN — KETOROLAC TROMETHAMINE 30 MG: 30 INJECTION, SOLUTION INTRAMUSCULAR; INTRAVENOUS at 12:37

## 2019-01-17 RX ADMIN — MIDAZOLAM HYDROCHLORIDE 2 MG: 1 INJECTION, SOLUTION INTRAMUSCULAR; INTRAVENOUS at 12:42

## 2019-01-17 RX ADMIN — SODIUM CHLORIDE 50 ML/HR: 9 INJECTION, SOLUTION INTRAVENOUS at 10:59

## 2019-01-17 RX ADMIN — DEXAMETHASONE SODIUM PHOSPHATE 10 MG: 10 INJECTION, SOLUTION INTRAMUSCULAR; INTRAVENOUS at 12:45

## 2019-01-17 RX ADMIN — LIDOCAINE HYDROCHLORIDE 5 ML: 10 INJECTION, SOLUTION EPIDURAL; INFILTRATION; INTRACAUDAL; PERINEURAL at 12:44

## 2019-01-17 RX ADMIN — FENTANYL CITRATE 25 MCG: 50 INJECTION INTRAMUSCULAR; INTRAVENOUS at 12:42

## 2019-01-17 RX ADMIN — FENTANYL CITRATE 50 MCG: 50 INJECTION INTRAMUSCULAR; INTRAVENOUS at 12:37

## 2019-01-17 RX ADMIN — IOHEXOL 3 ML: 300 INJECTION, SOLUTION INTRAVENOUS at 12:44

## 2019-01-17 RX ADMIN — CLINDAMYCIN IN 5 PERCENT DEXTROSE 600 MG: 12 INJECTION, SOLUTION INTRAVENOUS at 12:41

## 2019-01-17 RX ADMIN — SODIUM BICARBONATE 1.5 MEQ: 84 INJECTION PARENTERAL at 12:45

## 2019-01-17 NOTE — DISCHARGE INSTRUCTIONS
EPIDURAL STEROID INJECTION DISCHARGE INSTRUCTIONS      ACTIVITY  · Do not drive or operate machinery today  · No strenuous activity today - bending, lifting, etc    · You may resume normal activities starting tomorrow - start slowly and as tolerated  · You may shower today, but not tub baths or hot tubs  · You may have numbness for several hours from the local anesthetics  Please use caution and common sense, especially with weight-bearing activities  CARE OF THE INJECTION SITE  · If you have soreness or pain apply ice to the area today (20 minutes on and 20 minutes off)  · Starting tomorrow, you   · Notify [facility] if you have any of the following: redness, drainage, swelling or fever above 100°F     SPECIAL INSTRUCTIONS  · Please return the MBB diary to our office by mail, fax, or drop it off  MEDICATIONS  · Please do not take any break through or short acting pain medications for 8 hours after the block  · Continue to take all routine medications  · Our office may have instructed you to hold some medications    · You may resume ______

## 2019-01-17 NOTE — NURSING NOTE
Pt returned to [facility] awake, alert, IV infusing, no complaints, taking PO S/O at bedside   Rx for Percocet given to S/O

## 2019-01-17 NOTE — H&P
History of Present Illness: The patient is a 48 y o  female who presents with complaints of left lower limb pain, left lumbar radiculopathy    Patient Active Problem List   Diagnosis    S/P gastric bypass    Postsurgical malabsorption    Essential hypertension    Hypercholesterolemia    Sleep apnea    Tobacco user    Vitamin D deficiency       Past Medical History:   Diagnosis Date    Anxiety     Depression     h/o suicide attempt    Hypertension     Nephrolithiasis     h/o    Osteoarthritis     Radiculopathy     Vitamin D deficiency        Past Surgical History:   Procedure Laterality Date    ABDOMINAL HYSTERECTOMY      UMBERTO & BSO d/t/ endometriosis    APPENDECTOMY      CARPAL TUNNEL RELEASE Bilateral     CHOLECYSTECTOMY      ELBOW SURGERY Left 2010    ELBOW SURGERY Right     FOOT SURGERY      right great toe     HEMORROIDECTOMY      LATERAL EPICONDYLE RELEASE Right 1/25/2016    Procedure: RIGHT ELBOW TENNIS ELBOW RELEASE;  Surgeon: Purnima Godfrey MD;  [facility];  Service:     [place] W/SESMDC W/DIST METAR OSTEOT Right 4/8/2016    Procedure: Jigar Carter;  Surgeon: Rosaura Mares DPM;  [facility];  Service: Podiatry    NE PART EXCIS 5TH METATARSAL HEAD Right 4/8/2016    Procedure: Trfrancesca Robbins;  Surgeon: Rosaura Mares DPM;  [facility];  Service: Podiatry    SHOULDER ARTHROSCOPY Right          Current Outpatient Prescriptions:     clonazePAM (KlonoPIN) 0 5 mg tablet, clonazepam 0 5 mg tablet, Disp: , Rfl:     gabapentin (NEURONTIN) 300 mg capsule, Take 300 mg by mouth 3 (three) times a day , Disp: , Rfl:     omeprazole (PriLOSEC) 10 mg delayed release capsule, Take by mouth daily 1 tablet as needed ( pt unsure of dose)  , Disp: , Rfl:     oxyCODONE-acetaminophen (PERCOCET) 5-325 mg per tablet, Take 1 tablet by mouth every 8 (eight) hours as needed, Disp: , Rfl: 0    TiZANidine (ZANAFLEX) 2 MG capsule, Take by mouth 3 (three) times a day 1 Tablet at hs  ( Pt unsure of dose), Disp: , Rfl:     albuterol (PROVENTIL HFA,VENTOLIN HFA) 90 mcg/act inhaler, Inhale 2 puffs every 4 (four) hours as needed for wheezing or shortness of breath, Disp: 1 Inhaler, Rfl: 0    pantoprazole (PROTONIX) 40 mg tablet, take 1 tablet by mouth once daily, Disp: 30 tablet, Rfl: 2    Current Facility-Administered Medications:     sodium chloride 0 9 % infusion, 50 mL/hr, Intravenous, Continuous, Kem Nicole MD, Last Rate: 50 mL/hr at 01/17/19 1059, 50 mL/hr at 01/17/19 1059    Allergies   Allergen Reactions    Ciprofloxacin Other (See Comments)     Mouth swells, angioedema and blister    Morphine And Related GI Intolerance    Sulfa Antibiotics Swelling     Oral angioedema and blister       Physical Exam:   Vitals:    01/17/19 1050   BP: 138/65   Pulse: 74   Resp: 18   Temp: 98 °F (36 7 °C)   SpO2: 98%     General: Awake, Alert, Oriented x 3, Mood and affect appropriate  Respiratory: Respirations even and unlabored  Cardiovascular: Peripheral pulses intact; no edema  Musculoskeletal Exam:  Positive straight leg raise left side, limited lumbar range of motion      Assessment:   1  Intervertebral disc stenosis of neural canal of lumbar region    2   Neuritis or radiculitis due to rupture of lumbar intervertebral disc        Plan:  Left L4-L5 ROCIO

## 2019-06-19 ENCOUNTER — TELEPHONE (OUTPATIENT)
Dept: SURGERY | Facility: HOSPITAL | Age: 54
End: 2019-06-19

## 2019-06-19 RX ORDER — SODIUM CHLORIDE 9 MG/ML
50 INJECTION, SOLUTION INTRAVENOUS CONTINUOUS
Status: CANCELLED | OUTPATIENT
Start: 2019-06-19

## 2019-06-20 ENCOUNTER — HOSPITAL ENCOUNTER (OUTPATIENT)
Dept: INTERVENTIONAL RADIOLOGY/VASCULAR | Facility: HOSPITAL | Age: 54
Discharge: HOME | End: 2019-06-20
Admitting: PHYSICAL MEDICINE & REHABILITATION
Payer: COMMERCIAL

## 2019-06-20 VITALS
OXYGEN SATURATION: 100 % | WEIGHT: 134 LBS | HEART RATE: 59 BPM | DIASTOLIC BLOOD PRESSURE: 82 MMHG | HEIGHT: 66 IN | TEMPERATURE: 97.6 F | SYSTOLIC BLOOD PRESSURE: 141 MMHG | RESPIRATION RATE: 16 BRPM | BODY MASS INDEX: 21.53 KG/M2

## 2019-06-20 DIAGNOSIS — M54.2 CERVICALGIA: ICD-10-CM

## 2019-06-20 DIAGNOSIS — M12.88 OTHER SPECIFIC ARTHROPATHIES, NOT ELSEWHERE CLASSIFIED, OTHER SPECIFIED SITE: ICD-10-CM

## 2019-06-20 PROCEDURE — 99152 MOD SED SAME PHYS/QHP 5/>YRS: CPT

## 2019-06-20 RX ORDER — BUPIVACAINE HCL/PF 2.5 MG/ML
VIAL (ML) INJECTION CODE/TRAUMA/SEDATION MEDICATION
Status: COMPLETED | OUTPATIENT
Start: 2019-06-20 | End: 2019-06-20

## 2019-06-20 RX ORDER — SODIUM CHLORIDE 9 MG/ML
50 INJECTION, SOLUTION INTRAVENOUS CONTINUOUS
Status: DISCONTINUED | OUTPATIENT
Start: 2019-06-20 | End: 2019-06-24 | Stop reason: HOSPADM

## 2019-06-20 RX ORDER — CEFAZOLIN SODIUM 1 G/3ML
INJECTION, POWDER, FOR SOLUTION INTRAMUSCULAR; INTRAVENOUS CODE/TRAUMA/SEDATION MEDICATION
Status: COMPLETED | OUTPATIENT
Start: 2019-06-20 | End: 2019-06-20

## 2019-06-20 RX ORDER — LIDOCAINE HYDROCHLORIDE 10 MG/ML
INJECTION, SOLUTION EPIDURAL; INFILTRATION; INTRACAUDAL; PERINEURAL CODE/TRAUMA/SEDATION MEDICATION
Status: COMPLETED | OUTPATIENT
Start: 2019-06-20 | End: 2019-06-20

## 2019-06-20 RX ORDER — FENTANYL CITRATE 50 UG/ML
INJECTION, SOLUTION INTRAMUSCULAR; INTRAVENOUS CODE/TRAUMA/SEDATION MEDICATION
Status: COMPLETED | OUTPATIENT
Start: 2019-06-20 | End: 2019-06-20

## 2019-06-20 RX ORDER — MIDAZOLAM HYDROCHLORIDE 1 MG/ML
INJECTION INTRAMUSCULAR; INTRAVENOUS CODE/TRAUMA/SEDATION MEDICATION
Status: COMPLETED | OUTPATIENT
Start: 2019-06-20 | End: 2019-06-20

## 2019-06-20 RX ADMIN — CEFAZOLIN SODIUM 1000 MG: 1 POWDER, FOR SOLUTION INTRAMUSCULAR; INTRAVENOUS at 10:37

## 2019-06-20 RX ADMIN — SODIUM BICARBONATE 1.5 MEQ: 84 INJECTION, SOLUTION INTRAVENOUS at 10:45

## 2019-06-20 RX ADMIN — BUPIVACAINE HYDROCHLORIDE 5 ML: 2.5 INJECTION, SOLUTION EPIDURAL; INFILTRATION; INTRACAUDAL at 10:44

## 2019-06-20 RX ADMIN — MIDAZOLAM HYDROCHLORIDE 1 MG: 1 INJECTION, SOLUTION INTRAMUSCULAR; INTRAVENOUS at 10:39

## 2019-06-20 RX ADMIN — FENTANYL CITRATE 50 MCG: 50 INJECTION INTRAMUSCULAR; INTRAVENOUS at 10:33

## 2019-06-20 RX ADMIN — MIDAZOLAM HYDROCHLORIDE 2 MG: 1 INJECTION, SOLUTION INTRAMUSCULAR; INTRAVENOUS at 10:32

## 2019-06-20 RX ADMIN — SODIUM CHLORIDE 50 ML/HR: 9 INJECTION, SOLUTION INTRAVENOUS at 09:57

## 2019-06-20 RX ADMIN — FENTANYL CITRATE 50 MCG: 50 INJECTION INTRAMUSCULAR; INTRAVENOUS at 10:39

## 2019-06-20 RX ADMIN — IOHEXOL 3 ML: 300 INJECTION, SOLUTION INTRAVENOUS at 10:44

## 2019-06-20 RX ADMIN — LIDOCAINE HYDROCHLORIDE 5 ML: 10 INJECTION, SOLUTION EPIDURAL; INFILTRATION; INTRACAUDAL; PERINEURAL at 10:44

## 2019-08-15 ENCOUNTER — HOSPITAL ENCOUNTER (OUTPATIENT)
Dept: INTERVENTIONAL RADIOLOGY/VASCULAR | Facility: HOSPITAL | Age: 54
Discharge: HOME | End: 2019-08-15
Payer: COMMERCIAL

## 2019-08-15 VITALS
SYSTOLIC BLOOD PRESSURE: 139 MMHG | HEART RATE: 72 BPM | RESPIRATION RATE: 16 BRPM | TEMPERATURE: 99 F | OXYGEN SATURATION: 99 % | DIASTOLIC BLOOD PRESSURE: 76 MMHG

## 2019-08-15 DIAGNOSIS — M50.123 DISORDER OF INTERVERTEBRAL DISC AT C6-C7 LEVEL WITH RADICULOPATHY: ICD-10-CM

## 2019-08-15 PROCEDURE — 99152 MOD SED SAME PHYS/QHP 5/>YRS: CPT

## 2019-08-15 RX ORDER — CEFAZOLIN SODIUM 1 G/3ML
INJECTION, POWDER, FOR SOLUTION INTRAMUSCULAR; INTRAVENOUS CODE/TRAUMA/SEDATION MEDICATION
Status: COMPLETED | OUTPATIENT
Start: 2019-08-15 | End: 2019-08-15

## 2019-08-15 RX ORDER — LIDOCAINE HYDROCHLORIDE 10 MG/ML
INJECTION, SOLUTION EPIDURAL; INFILTRATION; INTRACAUDAL; PERINEURAL CODE/TRAUMA/SEDATION MEDICATION
Status: COMPLETED | OUTPATIENT
Start: 2019-08-15 | End: 2019-08-15

## 2019-08-15 RX ORDER — PAPAVERINE HCL 150 MG
CAPSULE, EXTENDED RELEASE ORAL CODE/TRAUMA/SEDATION MEDICATION
Status: COMPLETED | OUTPATIENT
Start: 2019-08-15 | End: 2019-08-15

## 2019-08-15 RX ORDER — FENTANYL CITRATE 50 UG/ML
INJECTION, SOLUTION INTRAMUSCULAR; INTRAVENOUS CODE/TRAUMA/SEDATION MEDICATION
Status: COMPLETED | OUTPATIENT
Start: 2019-08-15 | End: 2019-08-15

## 2019-08-15 RX ORDER — MIDAZOLAM HYDROCHLORIDE 1 MG/ML
INJECTION INTRAMUSCULAR; INTRAVENOUS CODE/TRAUMA/SEDATION MEDICATION
Status: COMPLETED | OUTPATIENT
Start: 2019-08-15 | End: 2019-08-15

## 2019-08-15 RX ORDER — KETOROLAC TROMETHAMINE 30 MG/ML
INJECTION, SOLUTION INTRAMUSCULAR; INTRAVENOUS CODE/TRAUMA/SEDATION MEDICATION
Status: COMPLETED | OUTPATIENT
Start: 2019-08-15 | End: 2019-08-15

## 2019-08-15 RX ADMIN — CEFAZOLIN SODIUM 1000 MG: 1 POWDER, FOR SOLUTION INTRAMUSCULAR; INTRAVENOUS at 12:59

## 2019-08-15 RX ADMIN — FENTANYL CITRATE 50 MCG: 50 INJECTION INTRAMUSCULAR; INTRAVENOUS at 13:00

## 2019-08-15 RX ADMIN — KETOROLAC TROMETHAMINE 30 MG: 30 INJECTION, SOLUTION INTRAMUSCULAR; INTRAVENOUS at 12:59

## 2019-08-15 RX ADMIN — LIDOCAINE HYDROCHLORIDE 3 ML: 10 INJECTION, SOLUTION EPIDURAL; INFILTRATION; INTRACAUDAL; PERINEURAL at 13:03

## 2019-08-15 RX ADMIN — DEXAMETHASONE SODIUM PHOSPHATE 10 MG: 10 INJECTION, SOLUTION INTRAMUSCULAR; INTRAVENOUS at 13:03

## 2019-08-15 RX ADMIN — SODIUM BICARBONATE 50 MEQ: 84 INJECTION, SOLUTION INTRAVENOUS at 13:03

## 2019-08-15 RX ADMIN — IOHEXOL 3 ML: 350 INJECTION, SOLUTION INTRAVENOUS at 13:03

## 2019-08-15 RX ADMIN — MIDAZOLAM HYDROCHLORIDE 2 MG: 1 INJECTION, SOLUTION INTRAMUSCULAR; INTRAVENOUS at 13:00

## 2019-08-15 NOTE — H&P
History of Present Illness: The patient is a 48 y o  female who presents with complaints of left upper limb radiculopathy, neck and left upper arm pain, cervical bulging    Patient Active Problem List   Diagnosis    S/P gastric bypass    Postsurgical malabsorption    Essential hypertension    Hypercholesterolemia    Sleep apnea    Tobacco user    Vitamin D deficiency       Past Medical History:   Diagnosis Date    Anxiety     Depression     h/o suicide attempt    Hypertension     Nephrolithiasis     h/o    Osteoarthritis     Radiculopathy     Vitamin D deficiency        Past Surgical History:   Procedure Laterality Date    ABDOMINAL HYSTERECTOMY      UMBERTO & BSO d/t/ endometriosis    APPENDECTOMY      CARPAL TUNNEL RELEASE Bilateral     CHOLECYSTECTOMY      ELBOW SURGERY Left 2010    ELBOW SURGERY Right     FOOT SURGERY      right great toe     HEMORROIDECTOMY      LATERAL EPICONDYLE RELEASE Right 1/25/2016    Procedure: RIGHT ELBOW TENNIS ELBOW RELEASE;  Surgeon: Edin Richardson MD;  [facility];  Service:     [place] W/SESMDC W/DIST METAR OSTEOT Right 4/8/2016    Procedure: Daily Ramos;  Surgeon: Samanta Guadarrama DPM;  [facility];  Service: Podiatry    [place] PART EXCIS 5TH METATARSAL HEAD Right 4/8/2016    Procedure: Dorathy Covarrubias;  Surgeon: Samanta Guadarrama DPM;  [facility];  Service: Podiatry    SHOULDER ARTHROSCOPY Right          Current Outpatient Medications:     albuterol (PROVENTIL HFA,VENTOLIN HFA) 90 mcg/act inhaler, Inhale 2 puffs every 4 (four) hours as needed for wheezing or shortness of breath, Disp: 1 Inhaler, Rfl: 0    clonazePAM (KlonoPIN) 0 5 mg tablet, clonazepam 0 5 mg tablet, Disp: , Rfl:     gabapentin (NEURONTIN) 300 mg capsule, Take 300 mg by mouth 3 (three) times a day , Disp: , Rfl:     omeprazole (PriLOSEC) 10 mg delayed release capsule, Take by mouth daily 1 tablet as needed ( pt unsure of dose)  , Disp: , Rfl:    oxyCODONE-acetaminophen (PERCOCET) 5-325 mg per tablet, Take 1 tablet by mouth every 8 (eight) hours as needed, Disp: , Rfl: 0    TiZANidine (ZANAFLEX) 2 MG capsule, Take by mouth 3 (three) times a day 1 Tablet at hs  ( Pt unsure of dose), Disp: , Rfl:     TRAZODONE HCL PO, Take by mouth as needed, Disp: , Rfl:     Allergies   Allergen Reactions    Ciprofloxacin Other (See Comments)     Mouth swells, angioedema and blister    Morphine And Related GI Intolerance    Sulfa Antibiotics Swelling     Oral angioedema and blister       Physical Exam: There were no vitals filed for this visit    General: Awake, Alert, Oriented x 3, Mood and affect appropriate  Respiratory: Respirations even and unlabored  Cardiovascular: Peripheral pulses intact; no edema  Musculoskeletal Exam:  Positive Spurling's test left side only, limited cervical range of motion with spasm      Assessment: C6/7 cervical bulging disc with radiculopathy    Plan:  Left C6 and C7 transforaminal cervical epidural steroid injections under fluoroscopic guidance

## 2019-08-15 NOTE — PERIOPERATIVE NURSING NOTE
ivs out  Wants to go home  Discharged ambulatory after discharge instructions given and verbalized an understanding of same

## 2019-10-01 RX ORDER — SODIUM CHLORIDE 9 MG/ML
50 INJECTION, SOLUTION INTRAVENOUS CONTINUOUS
Status: CANCELLED | OUTPATIENT
Start: 2019-10-01

## 2019-10-02 ENCOUNTER — TELEPHONE (OUTPATIENT)
Dept: PREADMISSION TESTING | Facility: HOSPITAL | Age: 54
End: 2019-10-02

## 2019-10-03 ENCOUNTER — HOSPITAL ENCOUNTER (OUTPATIENT)
Dept: INTERVENTIONAL RADIOLOGY/VASCULAR | Facility: HOSPITAL | Age: 54
Discharge: HOME | End: 2019-10-03
Admitting: PHYSICAL MEDICINE & REHABILITATION
Payer: COMMERCIAL

## 2019-10-03 VITALS
TEMPERATURE: 97.6 F | DIASTOLIC BLOOD PRESSURE: 59 MMHG | RESPIRATION RATE: 18 BRPM | OXYGEN SATURATION: 99 % | HEART RATE: 65 BPM | SYSTOLIC BLOOD PRESSURE: 110 MMHG

## 2019-10-03 DIAGNOSIS — M47.896 OTHER OSTEOARTHRITIS OF SPINE, LUMBAR REGION: ICD-10-CM

## 2019-10-03 PROCEDURE — 99152 MOD SED SAME PHYS/QHP 5/>YRS: CPT

## 2019-10-03 RX ORDER — FENTANYL CITRATE 50 UG/ML
INJECTION, SOLUTION INTRAMUSCULAR; INTRAVENOUS CODE/TRAUMA/SEDATION MEDICATION
Status: COMPLETED | OUTPATIENT
Start: 2019-10-03 | End: 2019-10-03

## 2019-10-03 RX ORDER — DIPHENHYDRAMINE HYDROCHLORIDE 50 MG/ML
25 INJECTION INTRAMUSCULAR; INTRAVENOUS EVERY 6 HOURS PRN
Status: DISCONTINUED | OUTPATIENT
Start: 2019-10-03 | End: 2019-10-07 | Stop reason: HOSPADM

## 2019-10-03 RX ORDER — SODIUM CHLORIDE 9 MG/ML
50 INJECTION, SOLUTION INTRAVENOUS CONTINUOUS
Status: DISCONTINUED | OUTPATIENT
Start: 2019-10-03 | End: 2019-10-07 | Stop reason: HOSPADM

## 2019-10-03 RX ORDER — CLINDAMYCIN PHOSPHATE 600 MG/50ML
INJECTION INTRAVENOUS
Status: COMPLETED | OUTPATIENT
Start: 2019-10-03 | End: 2019-10-03

## 2019-10-03 RX ORDER — MIDAZOLAM HYDROCHLORIDE 1 MG/ML
INJECTION INTRAMUSCULAR; INTRAVENOUS CODE/TRAUMA/SEDATION MEDICATION
Status: COMPLETED | OUTPATIENT
Start: 2019-10-03 | End: 2019-10-03

## 2019-10-03 RX ORDER — BUPIVACAINE HCL/PF 2.5 MG/ML
VIAL (ML) INJECTION CODE/TRAUMA/SEDATION MEDICATION
Status: COMPLETED | OUTPATIENT
Start: 2019-10-03 | End: 2019-10-03

## 2019-10-03 RX ORDER — LIDOCAINE HYDROCHLORIDE 10 MG/ML
INJECTION, SOLUTION EPIDURAL; INFILTRATION; INTRACAUDAL; PERINEURAL CODE/TRAUMA/SEDATION MEDICATION
Status: COMPLETED | OUTPATIENT
Start: 2019-10-03 | End: 2019-10-03

## 2019-10-03 RX ADMIN — LIDOCAINE HYDROCHLORIDE 5 ML: 10 INJECTION, SOLUTION EPIDURAL; INFILTRATION; INTRACAUDAL; PERINEURAL at 10:56

## 2019-10-03 RX ADMIN — FENTANYL CITRATE 50 MCG: 50 INJECTION INTRAMUSCULAR; INTRAVENOUS at 10:47

## 2019-10-03 RX ADMIN — MIDAZOLAM HYDROCHLORIDE 2 MG: 1 INJECTION, SOLUTION INTRAMUSCULAR; INTRAVENOUS at 10:49

## 2019-10-03 RX ADMIN — MIDAZOLAM HYDROCHLORIDE 1 MG: 1 INJECTION, SOLUTION INTRAMUSCULAR; INTRAVENOUS at 10:58

## 2019-10-03 RX ADMIN — SODIUM CHLORIDE 50 ML/HR: 0.9 INJECTION, SOLUTION INTRAVENOUS at 10:11

## 2019-10-03 RX ADMIN — MIDAZOLAM HYDROCHLORIDE 1 MG: 1 INJECTION, SOLUTION INTRAMUSCULAR; INTRAVENOUS at 10:51

## 2019-10-03 RX ADMIN — CLINDAMYCIN IN 5 PERCENT DEXTROSE 600 MG: 12 INJECTION, SOLUTION INTRAVENOUS at 10:48

## 2019-10-03 RX ADMIN — BUPIVACAINE HYDROCHLORIDE 5 ML: 2.5 INJECTION, SOLUTION EPIDURAL; INFILTRATION; INTRACAUDAL at 10:57

## 2019-10-03 RX ADMIN — LIDOCAINE HYDROCHLORIDE 6 ML: 10 INJECTION, SOLUTION EPIDURAL; INFILTRATION; INTRACAUDAL; PERINEURAL at 10:57

## 2019-10-03 RX ADMIN — DIPHENHYDRAMINE HYDROCHLORIDE 25 MG: 50 INJECTION, SOLUTION INTRAMUSCULAR; INTRAVENOUS at 11:40

## 2019-10-03 RX ADMIN — IOHEXOL 5 ML: 300 INJECTION, SOLUTION INTRAVENOUS at 10:50

## 2019-10-03 RX ADMIN — FENTANYL CITRATE 25 MCG: 50 INJECTION INTRAMUSCULAR; INTRAVENOUS at 10:51

## 2019-10-03 NOTE — H&P
History of Present Illness: The patient is a 48 y o  female who presents with complaints of left low back pain, lumbar spondylosis, lumbar facet joints syndrome    Patient Active Problem List   Diagnosis    S/P gastric bypass    Postsurgical malabsorption    Essential hypertension    Hypercholesterolemia    Sleep apnea    Tobacco user    Vitamin D deficiency       Past Medical History:   Diagnosis Date    Anxiety     Depression     h/o suicide attempt    Hypertension     Nephrolithiasis     h/o    Osteoarthritis     Radiculopathy     Vitamin D deficiency        Past Surgical History:   Procedure Laterality Date    ABDOMINAL HYSTERECTOMY      UMBERTO & BSO d/t/ endometriosis    APPENDECTOMY      CARPAL TUNNEL RELEASE Bilateral     CHOLECYSTECTOMY      ELBOW SURGERY Left 2010    ELBOW SURGERY Right     FOOT SURGERY      right great toe     HEMORROIDECTOMY      LATERAL EPICONDYLE RELEASE Right 1/25/2016    Procedure: RIGHT ELBOW TENNIS ELBOW RELEASE;  Surgeon: Fortunato Pérez MD;  [facility];  Service:     [place] W/SESMDC W/DIST METAR OSTEOT Right 4/8/2016    Procedure: Jamee Plana;  Surgeon: Isela Shultz DPM;  [facility];  Service: Podiatry    [place] PART EXCIS 5TH METATARSAL HEAD Right 4/8/2016    Procedure: Petty Shed;  Surgeon: Isela Shultz DPM;  [facility];  Service: Podiatry    SHOULDER ARTHROSCOPY Right          Current Outpatient Medications:     albuterol (PROVENTIL HFA,VENTOLIN HFA) 90 mcg/act inhaler, Inhale 2 puffs every 4 (four) hours as needed for wheezing or shortness of breath, Disp: 1 Inhaler, Rfl: 0    clonazePAM (KlonoPIN) 0 5 mg tablet, clonazepam 0 5 mg tablet, Disp: , Rfl:     gabapentin (NEURONTIN) 300 mg capsule, Take 300 mg by mouth 3 (three) times a day , Disp: , Rfl:     omeprazole (PriLOSEC) 10 mg delayed release capsule, Take by mouth daily 1 tablet as needed ( pt unsure of dose)  , Disp: , Rfl:    oxyCODONE-acetaminophen (PERCOCET) 5-325 mg per tablet, Take 1 tablet by mouth every 8 (eight) hours as needed, Disp: , Rfl: 0    TiZANidine (ZANAFLEX) 2 MG capsule, Take by mouth 3 (three) times a day 1 Tablet at hs  ( Pt unsure of dose), Disp: , Rfl:     TRAZODONE HCL PO, Take by mouth as needed, Disp: , Rfl:     Current Facility-Administered Medications:     sodium chloride 0 9 % infusion, 50 mL/hr, Intravenous, Continuous, Kem Nicole MD    Allergies   Allergen Reactions    Ciprofloxacin Other (See Comments)     Mouth swells, angioedema and blister    Morphine And Related GI Intolerance    Sulfa Antibiotics Swelling     Oral angioedema and blister       Physical Exam: There were no vitals filed for this visit  General: Awake, Alert, Oriented x 3, Mood and affect appropriate  Respiratory: Respirations even and unlabored  Cardiovascular: Peripheral pulses intact; no edema  Musculoskeletal Exam:  Positive left lumbar facet loading test, negative straight leg raise, limited lumbar range of motion with spasm    Assessment:   1   Other osteoarthritis of spine, lumbar region        Plan:  Diagnostic left lumbar medial branch blocks under fluoroscopic guidance

## 2019-10-03 NOTE — NURSING NOTE
Receive back into [facility] post procedure  Complaint of itching  No rash/hives noted  Scratch marks noted on upper back and abdomen  Bandaids x2 on lower back dry/intact  Denies pain  VSS

## 2019-10-03 NOTE — NURSING NOTE
Continuing to scratch back and abdomen  No rash/welts noted  Large red scratch marks and back/abdomen noted  Call placed to Dr White Memos in Cath Lab  New orders received

## 2019-10-22 ENCOUNTER — TRANSCRIBE ORDERS (OUTPATIENT)
Dept: NEUROSURGERY | Facility: CLINIC | Age: 54
End: 2019-10-22

## 2019-10-22 DIAGNOSIS — M54.50 LOWER BACK PAIN: Primary | ICD-10-CM

## 2019-11-11 ENCOUNTER — DOCUMENTATION (OUTPATIENT)
Dept: NEUROSURGERY | Facility: CLINIC | Age: 54
End: 2019-11-11

## 2019-11-11 ENCOUNTER — OFFICE VISIT (OUTPATIENT)
Dept: NEUROSURGERY | Facility: CLINIC | Age: 54
End: 2019-11-11
Payer: COMMERCIAL

## 2019-11-11 VITALS
WEIGHT: 124.6 LBS | HEART RATE: 70 BPM | DIASTOLIC BLOOD PRESSURE: 70 MMHG | SYSTOLIC BLOOD PRESSURE: 112 MMHG | BODY MASS INDEX: 20.03 KG/M2 | HEIGHT: 66 IN | TEMPERATURE: 97.8 F

## 2019-11-11 DIAGNOSIS — M54.50 LOWER BACK PAIN: ICD-10-CM

## 2019-11-11 DIAGNOSIS — M25.512 CHRONIC LEFT SHOULDER PAIN: Primary | ICD-10-CM

## 2019-11-11 DIAGNOSIS — G89.29 CHRONIC LEFT SHOULDER PAIN: Primary | ICD-10-CM

## 2019-11-11 PROCEDURE — 99214 OFFICE O/P EST MOD 30 MIN: CPT | Performed by: NURSE PRACTITIONER

## 2019-11-11 NOTE — PROGRESS NOTES
Patient brought disc from [facility] to appointment today containin/10/2019-MRI Cervical health  All images uploaded and disc handed back to patient

## 2019-11-11 NOTE — PROGRESS NOTES
Neurosurgery Office Note  Gale Weber 48 y o  female MRN: [id number]      Assessment/Plan     · Presents as referral from Dr Tesfaye Gerardo (pain management) for chronic cervical pain  · Patient is status post ACDF in 2017 or 2018 with Dr Divina Humphrey  · She continues to undergo ROCIO treatment of both her lumbar and cervical spine with Dr Tesfaye Gerardo  · She states since approximately a year after her surgery, her left shoulder and mid-back "seize up" at times and the pain radiates to her chest  She becomes very uncomfortably until she "works out the muscles" and presents today for evaluation of this  · On exam she demonstrates no radiculopathy or myelopathy  Tenderness at C7-T6 area is reproduced with abduction of her left shoulder  Also tenderness at gleno-humeral join  Referred pain vs  Osteoarthritis of shoulder/impingement  · She presents with an MRI of her cervical spine today to review  Reviewed and interpretation as follows:  · MRI cervical spine 7/10/19: postoperative changes seen at C5-6  Some adjacent segment degeneration without central stenosis  Minimal right C4-5 foraminal stenosis  · Recommend that patient continue treatment with pain management, which she states eases her symptoms  · Recommended physical therapy referral to patient but she adamantly declines  States she has tried it in the past and it always makes her "worse " Last attempted 1 5 years ago  · Recommended evaluation of her left shoulder by an orthopedist to determine if any degree of her pain is related to shoulder pathology  · Patient and spouse verbalized understanding and are in agreement with plan  CHIEF COMPLAINT    No chief complaint on file  HISTORY    History of Present Illness     48y o  year old female with history of gastric bypass, osteoarthritis, tobacco user, hypertension presents today for evaluation left shoulder and mid back pain that she has been experiencing for approximately 1 year    She states she has been suffering from 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  She was able to deal with the pain with pain management until she underwent ACDF with Dr Ricardo Villagran in 2017  She states for about a year afterwards her pain was mostly relieved  She then began to develop neck and shoulder pain again  She describes it as not being present in the morning and then developing as the day proceeds  She states she feels sometimes it can be worked out with massage and movement  She still undergoes pain management treatment with Dr Teresa Renee seen  She states the injections that she gets do relieve a lot of her pain  She is also on Percocet, Zanaflex, and gabapentin for pain  She states the pain does not radiate down her arms and she has not developed any numbness or weakness  She states she feels she cannot lift things as well as she used to because her shoulder will start to hurt  She states shoulder movement elicits the pain in her back  She states that 1 point the pain from her left shoulder and back radiating into her chest and she presented to the emergency department because she felt like she may be having a cardiac event  She does also state that she had rotator cuff surgery on her right shoulder many years ago  She is right-hand dominant and thinks that the surgery helped significantly says that she does not experience the same shoulder pain on the right side  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 but feels that the pain is affecting her ability to do so  HPI    See Discussion    REVIEW OF SYSTEMS    Review of Systems   Musculoskeletal: Positive for back pain (upper back/ lower back pain pain ), gait problem (right leg pain ), neck pain and neck stiffness  Negative for arthralgias, joint swelling and myalgias  All other systems reviewed and are negative          Meds/Allergies     Current Outpatient Medications   Medication Sig Dispense Refill    albuterol (PROVENTIL HFA,VENTOLIN HFA) 90 mcg/act inhaler Inhale 2 puffs every 4 (four) hours as needed for wheezing or shortness of breath 1 Inhaler 0    clonazePAM (KlonoPIN) 0 5 mg tablet clonazepam 0 5 mg tablet      gabapentin (NEURONTIN) 300 mg capsule Take 300 mg by mouth 3 (three) times a day   omeprazole (PriLOSEC) 10 mg delayed release capsule Take by mouth daily 1 tablet as needed ( pt unsure of dose)   oxyCODONE-acetaminophen (PERCOCET) 5-325 mg per tablet Take 1 tablet by mouth every 8 (eight) hours as needed  0    TiZANidine (ZANAFLEX) 2 MG capsule Take by mouth 3 (three) times a day 1 Tablet at hs  ( Pt unsure of dose)      TRAZODONE HCL PO Take by mouth as needed       No current facility-administered medications for this visit          Allergies   Allergen Reactions    Ciprofloxacin Other (See Comments)     Mouth swells, angioedema and blister    Morphine And Related GI Intolerance    Sulfa Antibiotics Swelling     Oral angioedema and blister       PAST HISTORY    Past Medical History:   Diagnosis Date    Anxiety     Depression     h/o suicide attempt    Hypertension     Nephrolithiasis     h/o    Osteoarthritis     Radiculopathy     Vitamin D deficiency        Past Surgical History:   Procedure Laterality Date    ABDOMINAL HYSTERECTOMY      UMBERTO & BSO d/t/ endometriosis    APPENDECTOMY      CARPAL TUNNEL RELEASE Bilateral     CHOLECYSTECTOMY      ELBOW SURGERY Left 2010    ELBOW SURGERY Right     FOOT SURGERY      right great toe     HEMORROIDECTOMY      LATERAL EPICONDYLE RELEASE Right 1/25/2016    Procedure: RIGHT ELBOW TENNIS ELBOW RELEASE;  Surgeon: Jabier Hamlin MD;  [facility];  Service:     [place] W/SESMDC W/HEATH Gray Right 4/8/2016    Procedure: Zaid Mo;  Surgeon: Gonzalez Martinez DPM;  [facility];  Service: Podiatry    [place] PART EXCIS 5TH METATARSAL HEAD Right 4/8/2016    Procedure: Kathia Jacinto;  Surgeon: Gonzalez Martinez DPM; Location: [facility];  Service: Podiatry    SHOULDER ARTHROSCOPY Right        Social History     Tobacco Use    Smoking status: Current Every Day Smoker     Packs/day: 1 00     Years: 35 00     Pack years: 35 00    Smokeless tobacco: Never Used    Tobacco comment: smokes X 35 yrs, 1ppd; heavy tobacco smoker -10cigs/day as per [employer]   Substance Use Topics    Alcohol use: No     Comment: socially    Drug use: No       Family History   Problem Relation Age of Onset    Heart disease Mother     Hypertension Mother     Rheum arthritis Mother     Heart disease Father     Hypertension Father     Diabetes Father     Bipolar disorder Sister     Early death Brother     Cervical cancer Daughter          Above history personally reviewed  EXAM    Vitals:Last menstrual period 01/25/1986, not currently breastfeeding  ,There is no height or weight on file to calculate BMI  Physical Exam   Constitutional: She is oriented to person, place, and time  She appears well-developed and well-nourished  HENT:   Head: Normocephalic and atraumatic  Eyes: Pupils are equal, round, and reactive to light  EOM are normal    Neck: Normal range of motion  Neck supple  Cardiovascular: Normal rate and regular rhythm  Pulmonary/Chest: Effort normal and breath sounds normal    Abdominal: Soft  Musculoskeletal: Normal range of motion  She exhibits tenderness (glenohumerol area)  Neurological: She is alert and oriented to person, place, and time  She displays normal reflexes  No cranial nerve deficit or sensory deficit  She exhibits normal muscle tone  She has a normal Finger-Nose-Finger Test  Gait normal  Coordination normal    Reflex Scores:       Tricep reflexes are 2+ on the right side and 2+ on the left side  Bicep reflexes are 2+ on the right side and 2+ on the left side  Brachioradialis reflexes are 2+ on the right side and 2+ on the left side         Patellar reflexes are 2+ on the right side and 2+ on the left side  Achilles reflexes are 2+ on the right side and 2+ on the left side  Skin: Skin is warm and dry  Psychiatric: She has a normal mood and affect  Her speech is normal and behavior is normal  Judgment and thought content normal        Neurologic Exam     Mental Status   Oriented to person, place, and time  Oriented to person  Oriented to place  Oriented to time  Oriented to year, month and date  Registration: recalls 3 of 3 objects  Attention: normal  Concentration: normal    Speech: speech is normal   Level of consciousness: alert  Knowledge: good and consistent with education  Able to name object  Cranial Nerves   Cranial nerves II through XII intact  CN III, IV, VI   Pupils are equal, round, and reactive to light  Extraocular motions are normal    Right pupil: Size: 3 mm  Shape: regular  Reactivity: brisk  Consensual response: intact  Accommodation: intact  Left pupil: Size: 3 mm  Shape: regular  Reactivity: brisk  Consensual response: intact  Accommodation: intact  Nystagmus: none   Diplopia: none  Conjugate gaze: present    CN V   Right facial sensation deficit: none  Left facial sensation deficit: none    CN VII   Facial expression full, symmetric       CN VIII   Hearing: intact    CN IX, X   Palate: symmetric    CN XI   Right sternocleidomastoid strength: normal  Left sternocleidomastoid strength: normal  Right trapezius strength: normal  Left trapezius strength: normal    CN XII   Tongue: not atrophic  Fasciculations: absent  Tongue deviation: none    Motor Exam   Muscle bulk: normal  Overall muscle tone: normal  Right arm pronator drift: absent  Left arm pronator drift: absent    Strength   Right neck flexion: 5/5  Left neck flexion: 5/5  Right neck extension: 5/5  Left neck extension: 5/5  Right deltoid: 5/5  Left deltoid: 5/5  Right biceps: 5/5  Left biceps: 5/5  Right triceps: 5/5  Left triceps: 5/5  Right wrist extension: 5/5  Left wrist extension: 5/5  Right interossei: 5/5  Left interossei: 5/5  Right quadriceps: 5/5  Left quadriceps: 5/5  Right hamstrin/5  Left hamstrin/5  Right anterior tibial: 5/5  Left anterior tibial: 5/5  Right posterior tibial: 5/5  Left posterior tibial: 5/5  Right peroneal: 5/5  Left peroneal: 5/5  Right gastroc: 5/5  Left gastroc: 5/5    Sensory Exam   Light touch normal    Proprioception normal    Pinprick normal      Gait, Coordination, and Reflexes     Gait  Gait: normal    Coordination   Finger to nose coordination: normal    Tremor   Resting tremor: absent  Intention tremor: absent  Action tremor: absent    Reflexes   Right brachioradialis: 2+  Left brachioradialis: 2+  Right biceps: 2+  Left biceps: 2+  Right triceps: 2+  Left triceps: 2+  Right patellar: 2+  Left patellar: 2+  Right achilles: 2+  Left achilles: 2+  Right : 2+  Left : 2+  Right Mendoza: absent  Left Mendoza: absent  Right ankle clonus: absent  Left ankle clonus: absent        MEDICAL DECISION MAKING    Imaging Studies:     No results found  I have personally reviewed pertinent reports     and I have personally reviewed pertinent films in PACS

## 2019-11-13 ENCOUNTER — DOCUMENTATION (OUTPATIENT)
Dept: NEUROSURGERY | Facility: CLINIC | Age: 54
End: 2019-11-13

## 2020-01-15 ENCOUNTER — TELEPHONE (OUTPATIENT)
Dept: SURGERY | Facility: HOSPITAL | Age: 55
End: 2020-01-15

## 2020-01-15 RX ORDER — SODIUM CHLORIDE 9 MG/ML
50 INJECTION, SOLUTION INTRAVENOUS CONTINUOUS
Status: CANCELLED | OUTPATIENT
Start: 2020-01-15

## 2020-01-16 ENCOUNTER — HOSPITAL ENCOUNTER (OUTPATIENT)
Dept: INTERVENTIONAL RADIOLOGY/VASCULAR | Facility: HOSPITAL | Age: 55
Discharge: HOME | End: 2020-01-16
Payer: COMMERCIAL

## 2020-01-16 VITALS
DIASTOLIC BLOOD PRESSURE: 70 MMHG | SYSTOLIC BLOOD PRESSURE: 125 MMHG | WEIGHT: 120 LBS | RESPIRATION RATE: 16 BRPM | BODY MASS INDEX: 19.29 KG/M2 | TEMPERATURE: 98.3 F | OXYGEN SATURATION: 100 % | HEART RATE: 73 BPM | HEIGHT: 66 IN

## 2020-01-16 DIAGNOSIS — M47.896 OTHER OSTEOARTHRITIS OF SPINE, LUMBAR REGION: ICD-10-CM

## 2020-01-16 PROCEDURE — 99152 MOD SED SAME PHYS/QHP 5/>YRS: CPT

## 2020-01-16 RX ORDER — CEFAZOLIN SODIUM 1 G/3ML
INJECTION, POWDER, FOR SOLUTION INTRAMUSCULAR; INTRAVENOUS CODE/TRAUMA/SEDATION MEDICATION
Status: COMPLETED | OUTPATIENT
Start: 2020-01-16 | End: 2020-01-16

## 2020-01-16 RX ORDER — MIDAZOLAM HYDROCHLORIDE 2 MG/2ML
INJECTION, SOLUTION INTRAMUSCULAR; INTRAVENOUS CODE/TRAUMA/SEDATION MEDICATION
Status: COMPLETED | OUTPATIENT
Start: 2020-01-16 | End: 2020-01-16

## 2020-01-16 RX ORDER — FENTANYL CITRATE 50 UG/ML
INJECTION, SOLUTION INTRAMUSCULAR; INTRAVENOUS CODE/TRAUMA/SEDATION MEDICATION
Status: COMPLETED | OUTPATIENT
Start: 2020-01-16 | End: 2020-01-16

## 2020-01-16 RX ORDER — SODIUM CHLORIDE 9 MG/ML
50 INJECTION, SOLUTION INTRAVENOUS CONTINUOUS
Status: DISCONTINUED | OUTPATIENT
Start: 2020-01-16 | End: 2020-01-20 | Stop reason: HOSPADM

## 2020-01-16 RX ORDER — KETOROLAC TROMETHAMINE 30 MG/ML
INJECTION, SOLUTION INTRAMUSCULAR; INTRAVENOUS CODE/TRAUMA/SEDATION MEDICATION
Status: COMPLETED | OUTPATIENT
Start: 2020-01-16 | End: 2020-01-16

## 2020-01-16 RX ORDER — LIDOCAINE WITH 8.4% SOD BICARB 0.9%(10ML)
SYRINGE (ML) INJECTION CODE/TRAUMA/SEDATION MEDICATION
Status: COMPLETED | OUTPATIENT
Start: 2020-01-16 | End: 2020-01-16

## 2020-01-16 RX ADMIN — FENTANYL CITRATE 50 MCG: 50 INJECTION INTRAMUSCULAR; INTRAVENOUS at 12:56

## 2020-01-16 RX ADMIN — MIDAZOLAM HYDROCHLORIDE 2 MG: 1 INJECTION, SOLUTION INTRAMUSCULAR; INTRAVENOUS at 12:56

## 2020-01-16 RX ADMIN — CEFAZOLIN SODIUM 1000 MG: 1 POWDER, FOR SOLUTION INTRAMUSCULAR; INTRAVENOUS at 13:00

## 2020-01-16 RX ADMIN — LIDOCAINE HYDROCHLORIDE 10 ML: 10 INJECTION, SOLUTION INFILTRATION; PERINEURAL at 13:03

## 2020-01-16 RX ADMIN — SODIUM CHLORIDE 50 ML/HR: 0.9 INJECTION, SOLUTION INTRAVENOUS at 09:58

## 2020-01-16 RX ADMIN — KETOROLAC TROMETHAMINE 30 MG: 30 INJECTION, SOLUTION INTRAMUSCULAR; INTRAVENOUS at 12:56

## 2020-01-16 NOTE — H&P
History of Present Illness: The patient is a 48 y o  female who presents with complaints of left low back pain, lumbar spondylosis, lumbar facet joint syndrome    Patient Active Problem List   Diagnosis    S/P gastric bypass    Postsurgical malabsorption    Essential hypertension    Hypercholesterolemia    Sleep apnea    Tobacco user    Vitamin D deficiency       Past Medical History:   Diagnosis Date    Anxiety     Depression     h/o suicide attempt    Hypertension     Nephrolithiasis     h/o    Osteoarthritis     Radiculopathy     Vitamin D deficiency        Past Surgical History:   Procedure Laterality Date    ABDOMINAL HYSTERECTOMY      UMBERTO & BSO d/t/ endometriosis    APPENDECTOMY      CARPAL TUNNEL RELEASE Bilateral     CHOLECYSTECTOMY      ELBOW SURGERY Left 2010    ELBOW SURGERY Right     FOOT SURGERY      right great toe     HEMORROIDECTOMY      LATERAL EPICONDYLE RELEASE Right 1/25/2016    Procedure: RIGHT ELBOW TENNIS ELBOW RELEASE;  Surgeon: Chelo Fallon MD;  [facility];  Service:     NECK SURGERY  2017    Dr Imer Yang W/SESMDC W/DIST Ozzie Cord Right 4/8/2016    Procedure: Sami Choi;  Surgeon: Regla Fountain DPM;  [facility];  Service: Podiatry    DE PART EXCIS 5TH METATARSAL HEAD Right 4/8/2016    Procedure: Blaze Sheikh;  Surgeon: Regla Fountain DPM;  [facility];  Service: Podiatry    SHOULDER ARTHROSCOPY Right          Current Outpatient Medications:     gabapentin (NEURONTIN) 300 mg capsule, Take 600 mg by mouth 3 (three) times a day , Disp: , Rfl:     Multiple Vitamins-Minerals (MULTIVITAMIN ADULT PO), Take by mouth daily, Disp: , Rfl:     omeprazole (PriLOSEC) 10 mg delayed release capsule, Take by mouth daily 1 tablet as needed ( pt unsure of dose)  , Disp: , Rfl:     oxyCODONE-acetaminophen (PERCOCET) 5-325 mg per tablet, Take 1 tablet by mouth every 8 (eight) hours as needed, Disp: , Rfl: 0    TiZANidine (ZANAFLEX) 2 MG capsule, Take by mouth 3 (three) times a day 1 Tablet at hs  ( Pt unsure of dose), Disp: , Rfl:     TRAZODONE HCL PO, Take by mouth as needed, Disp: , Rfl:     Acetaminophen (TYLENOL EXTRA STRENGTH PO), Take by mouth as needed, Disp: , Rfl:     albuterol (PROVENTIL HFA,VENTOLIN HFA) 90 mcg/act inhaler, Inhale 2 puffs every 4 (four) hours as needed for wheezing or shortness of breath (Patient not taking: Reported on 11/11/2019), Disp: 1 Inhaler, Rfl: 0    Current Facility-Administered Medications:     sodium chloride 0 9 % infusion, 50 mL/hr, Intravenous, Continuous, Kem Nicole MD, Last Rate: 50 mL/hr at 01/16/20 0958, 50 mL/hr at 01/16/20 9001    Allergies   Allergen Reactions    Ciprofloxacin Other (See Comments)     Mouth swells, angioedema and blister    Morphine And Related GI Intolerance    Sulfa Antibiotics Swelling     Oral angioedema and blister       Physical Exam:   Vitals:    01/16/20 0944   BP: 116/68   Pulse: 68   Resp: 16   Temp: 98 3 °F (36 8 °C)   SpO2: 100%     General: Awake, Alert, Oriented x 3, Mood and affect appropriate  Respiratory: Respirations even and unlabored  Cardiovascular: Peripheral pulses intact; no edema  Musculoskeletal Exam:  Positive left lumbar facet loading test, negative straight leg raise, limited lumbar range of motion with spasm    Assessment:   1   Other osteoarthritis of spine, lumbar region        Plan:  Left L3-4, L4-5 and L5-S1 lumbar facet thermal rhizotomy ablation under fluoroscopic guidance

## 2020-02-05 ENCOUNTER — TELEPHONE (OUTPATIENT)
Dept: SURGERY | Facility: HOSPITAL | Age: 55
End: 2020-02-05

## 2020-02-06 ENCOUNTER — HOSPITAL ENCOUNTER (OUTPATIENT)
Dept: INTERVENTIONAL RADIOLOGY/VASCULAR | Facility: HOSPITAL | Age: 55
Discharge: HOME | End: 2020-02-06
Payer: COMMERCIAL

## 2020-02-06 VITALS
SYSTOLIC BLOOD PRESSURE: 101 MMHG | DIASTOLIC BLOOD PRESSURE: 57 MMHG | HEART RATE: 59 BPM | RESPIRATION RATE: 16 BRPM | TEMPERATURE: 97.6 F | WEIGHT: 120 LBS | OXYGEN SATURATION: 100 % | HEIGHT: 66 IN | BODY MASS INDEX: 19.29 KG/M2

## 2020-02-06 DIAGNOSIS — M50.822 OTHER CERVICAL DISC DISORDERS AT C5-C6 LEVEL: ICD-10-CM

## 2020-02-06 PROCEDURE — 99152 MOD SED SAME PHYS/QHP 5/>YRS: CPT

## 2020-02-06 RX ORDER — MIDAZOLAM HYDROCHLORIDE 2 MG/2ML
INJECTION, SOLUTION INTRAMUSCULAR; INTRAVENOUS CODE/TRAUMA/SEDATION MEDICATION
Status: COMPLETED | OUTPATIENT
Start: 2020-02-06 | End: 2020-02-06

## 2020-02-06 RX ORDER — PAPAVERINE HCL 150 MG
CAPSULE, EXTENDED RELEASE ORAL CODE/TRAUMA/SEDATION MEDICATION
Status: COMPLETED | OUTPATIENT
Start: 2020-02-06 | End: 2020-02-06

## 2020-02-06 RX ORDER — KETOROLAC TROMETHAMINE 30 MG/ML
INJECTION, SOLUTION INTRAMUSCULAR; INTRAVENOUS CODE/TRAUMA/SEDATION MEDICATION
Status: COMPLETED | OUTPATIENT
Start: 2020-02-06 | End: 2020-02-06

## 2020-02-06 RX ORDER — CLINDAMYCIN PHOSPHATE 600 MG/50ML
600 INJECTION INTRAVENOUS ONCE
Status: COMPLETED | OUTPATIENT
Start: 2020-02-06 | End: 2020-02-06

## 2020-02-06 RX ORDER — LIDOCAINE WITH 8.4% SOD BICARB 0.9%(10ML)
SYRINGE (ML) INJECTION CODE/TRAUMA/SEDATION MEDICATION
Status: COMPLETED | OUTPATIENT
Start: 2020-02-06 | End: 2020-02-06

## 2020-02-06 RX ORDER — SODIUM CHLORIDE 9 MG/ML
50 INJECTION, SOLUTION INTRAVENOUS CONTINUOUS
Status: DISCONTINUED | OUTPATIENT
Start: 2020-02-06 | End: 2020-02-10 | Stop reason: HOSPADM

## 2020-02-06 RX ORDER — FENTANYL CITRATE 50 UG/ML
INJECTION, SOLUTION INTRAMUSCULAR; INTRAVENOUS CODE/TRAUMA/SEDATION MEDICATION
Status: COMPLETED | OUTPATIENT
Start: 2020-02-06 | End: 2020-02-06

## 2020-02-06 RX ADMIN — FENTANYL CITRATE 50 MCG: 50 INJECTION INTRAMUSCULAR; INTRAVENOUS at 12:18

## 2020-02-06 RX ADMIN — CLINDAMYCIN IN 5 PERCENT DEXTROSE 600 MG: 12 INJECTION, SOLUTION INTRAVENOUS at 11:18

## 2020-02-06 RX ADMIN — Medication 3 ML: at 12:23

## 2020-02-06 RX ADMIN — IOHEXOL 3 ML: 350 INJECTION, SOLUTION INTRAVENOUS at 12:24

## 2020-02-06 RX ADMIN — FENTANYL CITRATE 25 MCG: 50 INJECTION INTRAMUSCULAR; INTRAVENOUS at 12:20

## 2020-02-06 RX ADMIN — MIDAZOLAM HYDROCHLORIDE 2 MG: 1 INJECTION, SOLUTION INTRAMUSCULAR; INTRAVENOUS at 12:17

## 2020-02-06 RX ADMIN — DEXAMETHASONE SODIUM PHOSPHATE 10 MG: 10 INJECTION, SOLUTION INTRAMUSCULAR; INTRAVENOUS at 12:24

## 2020-02-06 RX ADMIN — KETOROLAC TROMETHAMINE 30 MG: 30 INJECTION, SOLUTION INTRAMUSCULAR; INTRAVENOUS at 12:18

## 2020-02-06 RX ADMIN — MIDAZOLAM HYDROCHLORIDE 1 MG: 1 INJECTION, SOLUTION INTRAMUSCULAR; INTRAVENOUS at 12:20

## 2020-02-06 NOTE — NURSING NOTE
Receive into [facility] post procedure  Band-aid L-front neck dry/intact  Responds to voice  Denies pain at this time  VSS  Continue to monitor

## 2020-02-06 NOTE — NURSING NOTE
Call placed to Louise Bertrand in IR  Need pre-op orders and pt states she also requires antibiotic pre-op due due to plates and screws implanted

## 2020-02-06 NOTE — H&P
History of Present Illness: The patient is a 48 y o  female who presents with complaints of left cervical radiculopathy, left neck and arm pain, cervical bulging disc    Patient Active Problem List   Diagnosis    S/P gastric bypass    Postsurgical malabsorption    Essential hypertension    Hypercholesterolemia    Sleep apnea    Tobacco user    Vitamin D deficiency       Past Medical History:   Diagnosis Date    Anxiety     Depression     h/o suicide attempt    Hypertension     Nephrolithiasis     h/o    Osteoarthritis     Radiculopathy     Vitamin D deficiency        Past Surgical History:   Procedure Laterality Date    ABDOMINAL HYSTERECTOMY      UMBERTO & BSO d/t/ endometriosis    APPENDECTOMY      CARPAL TUNNEL RELEASE Bilateral     CHOLECYSTECTOMY      ELBOW SURGERY Left 2010    ELBOW SURGERY Right     FOOT SURGERY      right great toe     HEMORROIDECTOMY      LATERAL EPICONDYLE RELEASE Right 1/25/2016    Procedure: RIGHT ELBOW TENNIS ELBOW RELEASE;  Surgeon: Felicity Cranker, MD;  [facility];  Service:     NECK SURGERY  2017    Dr Yoly Cameron W/ISA W/HEATH Gallegos Escort Right 4/8/2016    Procedure: Jass Connelly;  Surgeon: Marquis Rodriguez DPM;  [facility];  Service: Podiatry    [place] PART EXCIS 5TH METATARSAL HEAD Right 4/8/2016    Procedure: Adam Lugo;  Surgeon: Marquis Rodriguez DPM;  [facility];  Service: Podiatry    SHOULDER ARTHROSCOPY Right          Current Outpatient Medications:     gabapentin (NEURONTIN) 300 mg capsule, Take 600 mg by mouth 3 (three) times a day , Disp: , Rfl:     Multiple Vitamins-Minerals (MULTIVITAMIN ADULT PO), Take by mouth daily, Disp: , Rfl:     oxyCODONE-acetaminophen (PERCOCET) 5-325 mg per tablet, Take 1 tablet by mouth every 8 (eight) hours as needed, Disp: , Rfl: 0    TiZANidine (ZANAFLEX) 2 MG capsule, Take by mouth 3 (three) times a day 1 Tablet at hs   ( Pt unsure of dose), Disp: , Rfl:     Acetaminophen (TYLENOL EXTRA STRENGTH PO), Take by mouth as needed, Disp: , Rfl:     albuterol (PROVENTIL HFA,VENTOLIN HFA) 90 mcg/act inhaler, Inhale 2 puffs every 4 (four) hours as needed for wheezing or shortness of breath (Patient not taking: Reported on 11/11/2019), Disp: 1 Inhaler, Rfl: 0    omeprazole (PriLOSEC) 10 mg delayed release capsule, Take by mouth daily 1 tablet as needed ( pt unsure of dose)  , Disp: , Rfl:     TRAZODONE HCL PO, Take by mouth as needed, Disp: , Rfl:     Current Facility-Administered Medications:     sodium chloride 0 9 % infusion, 50 mL/hr, Intravenous, Continuous, Kem Nicole MD    Allergies   Allergen Reactions    Ciprofloxacin Other (See Comments)     Mouth swells, angioedema and blister    Morphine And Related GI Intolerance    Sulfa Antibiotics Swelling     Oral angioedema and blister       Physical Exam:   Vitals:    02/06/20 0959   BP: 135/70   Pulse: 57   Resp: 20   Temp: (!) 97 1 °F (36 2 °C)   SpO2: 100%     General: Awake, Alert, Oriented x 3, Mood and affect appropriate  Respiratory: Respirations even and unlabored  Cardiovascular: Peripheral pulses intact; no edema  Musculoskeletal Exam:  Positive Spurling's test left side only, limited cervical range of motion      Assessment:   1   Other cervical disc disorders at C5-C6 level        Plan:  Left C6 and C7 cervical transforaminal epidural steroid injections under fluoroscopic guidance

## 2020-07-15 ENCOUNTER — TELEPHONE (OUTPATIENT)
Dept: SURGERY | Facility: HOSPITAL | Age: 55
End: 2020-07-15

## 2020-07-16 ENCOUNTER — HOSPITAL ENCOUNTER (OUTPATIENT)
Dept: INTERVENTIONAL RADIOLOGY/VASCULAR | Facility: HOSPITAL | Age: 55
Discharge: HOME | End: 2020-07-16
Payer: COMMERCIAL

## 2020-07-16 VITALS
BODY MASS INDEX: 19.29 KG/M2 | DIASTOLIC BLOOD PRESSURE: 68 MMHG | TEMPERATURE: 98.1 F | OXYGEN SATURATION: 96 % | WEIGHT: 120 LBS | SYSTOLIC BLOOD PRESSURE: 112 MMHG | HEIGHT: 66 IN | HEART RATE: 65 BPM | RESPIRATION RATE: 16 BRPM

## 2020-07-16 DIAGNOSIS — M12.88 OTHER SPECIFIC ARTHROPATHIES, NOT ELSEWHERE CLASSIFIED, OTHER SPECIFIED SITE: ICD-10-CM

## 2020-07-16 DIAGNOSIS — M47.892 OTHER SPONDYLOSIS, CERVICAL REGION: ICD-10-CM

## 2020-07-16 PROCEDURE — 99152 MOD SED SAME PHYS/QHP 5/>YRS: CPT

## 2020-07-16 RX ORDER — SODIUM CHLORIDE 9 MG/ML
50 INJECTION, SOLUTION INTRAVENOUS CONTINUOUS
Status: CANCELLED | OUTPATIENT
Start: 2020-07-16

## 2020-07-16 RX ORDER — MIDAZOLAM HYDROCHLORIDE 2 MG/2ML
INJECTION, SOLUTION INTRAMUSCULAR; INTRAVENOUS CODE/TRAUMA/SEDATION MEDICATION
Status: COMPLETED | OUTPATIENT
Start: 2020-07-16 | End: 2020-07-16

## 2020-07-16 RX ORDER — LIDOCAINE WITH 8.4% SOD BICARB 0.9%(10ML)
SYRINGE (ML) INJECTION CODE/TRAUMA/SEDATION MEDICATION
Status: COMPLETED | OUTPATIENT
Start: 2020-07-16 | End: 2020-07-16

## 2020-07-16 RX ORDER — FENTANYL CITRATE 50 UG/ML
INJECTION, SOLUTION INTRAMUSCULAR; INTRAVENOUS CODE/TRAUMA/SEDATION MEDICATION
Status: COMPLETED | OUTPATIENT
Start: 2020-07-16 | End: 2020-07-16

## 2020-07-16 RX ORDER — SODIUM CHLORIDE 9 MG/ML
50 INJECTION, SOLUTION INTRAVENOUS CONTINUOUS
Status: DISCONTINUED | OUTPATIENT
Start: 2020-07-16 | End: 2020-07-20 | Stop reason: HOSPADM

## 2020-07-16 RX ORDER — LIDOCAINE HYDROCHLORIDE 10 MG/ML
INJECTION, SOLUTION EPIDURAL; INFILTRATION; INTRACAUDAL; PERINEURAL CODE/TRAUMA/SEDATION MEDICATION
Status: COMPLETED | OUTPATIENT
Start: 2020-07-16 | End: 2020-07-16

## 2020-07-16 RX ORDER — AMOXICILLIN 500 MG/1
1000 CAPSULE ORAL
Status: COMPLETED | OUTPATIENT
Start: 2020-07-16 | End: 2020-07-16

## 2020-07-16 RX ORDER — KETOROLAC TROMETHAMINE 30 MG/ML
INJECTION, SOLUTION INTRAMUSCULAR; INTRAVENOUS CODE/TRAUMA/SEDATION MEDICATION
Status: COMPLETED | OUTPATIENT
Start: 2020-07-16 | End: 2020-07-16

## 2020-07-16 RX ADMIN — FENTANYL CITRATE 25 MCG: 50 INJECTION INTRAMUSCULAR; INTRAVENOUS at 12:21

## 2020-07-16 RX ADMIN — MIDAZOLAM HYDROCHLORIDE 1 MG: 1 INJECTION, SOLUTION INTRAMUSCULAR; INTRAVENOUS at 12:16

## 2020-07-16 RX ADMIN — FENTANYL CITRATE 50 MCG: 50 INJECTION INTRAMUSCULAR; INTRAVENOUS at 12:16

## 2020-07-16 RX ADMIN — AMOXICILLIN 1000 MG: 500 CAPSULE ORAL at 11:14

## 2020-07-16 RX ADMIN — SODIUM CHLORIDE 50 ML/HR: 0.9 INJECTION, SOLUTION INTRAVENOUS at 11:10

## 2020-07-16 RX ADMIN — Medication 4 ML: at 12:19

## 2020-07-16 RX ADMIN — MIDAZOLAM HYDROCHLORIDE 0.5 MG: 1 INJECTION, SOLUTION INTRAMUSCULAR; INTRAVENOUS at 12:21

## 2020-07-16 RX ADMIN — IOHEXOL 6 ML: 350 INJECTION, SOLUTION INTRAVENOUS at 12:20

## 2020-07-16 RX ADMIN — KETOROLAC TROMETHAMINE 30 MG: 30 INJECTION, SOLUTION INTRAMUSCULAR; INTRAVENOUS at 12:14

## 2020-07-16 RX ADMIN — LIDOCAINE HYDROCHLORIDE 12 ML: 10 INJECTION, SOLUTION EPIDURAL; INFILTRATION; INTRACAUDAL; PERINEURAL at 12:19

## 2020-07-16 NOTE — DISCHARGE INSTRUCTIONS
Epidural Steroid Injection   WHAT YOU NEED TO KNOW:   An epidural steroid injection (ROCIO) is a procedure to inject steroid medicine into the epidural space  The epidural space is between your spinal cord and vertebrae  Steroids reduce inflammation and fluid buildup in your spine that may be causing pain  You may be given pain medicine along with the steroids  DISCHARGE INSTRUCTIONS:   Seek care immediately if:   · Blood soaks through your bandage  · Your wound is red, swollen, or draining pus  · You have a fever or chills, severe back pain, and the procedure area is sensitive to the touch  · You have a seizure  · You have trouble moving your legs  · You have weakness or numbness in your legs  · You cannot control when you urinate or have a bowel movement  Contact your healthcare provider if:   · You have nausea or are vomiting  · Your face or neck is red for a few days and you feel warm  · You have more pain than you had before the procedure  · You have swelling in your hands or feet  · You have questions or concerns about your condition or care  Follow up with your healthcare provider as directed:  Write down your questions so you remember to ask them during your visits  Care for your wound as directed: You may remove the bandage before you go to bed the day of your procedure  You may take a shower, but do not take a bath for at least 24 hours  Self-care:   · Do not drive,  use machines, or do strenuous activity for 24 hours after your procedure or as directed  · Continue other treatments  as directed  Steroid injections alone will not control your pain  The injections are meant to be used with other treatments, such as physical therapy  © 2017 [facility] Information is for End User's use only and may not be sold, redistributed or otherwise used for commercial purposes   All illustrations and images included in CareNotes® are the copyrighted property of [employer]  or Weston Lindsay  The above information is an  only  It is not intended as medical advice for individual conditions or treatments  Talk to your doctor, nurse or pharmacist before following any medical regimen to see if it is safe and effective for you

## 2020-07-16 NOTE — NURSING NOTE
Received from IR procedure at 1230  Alert and oriented  Denies pain  Moves all extremities  Band-aids (3) on posterior left side of neck  No bleeding or hematoma  Respirations easy and non labored  IV fluids continue  Call bell in reach

## 2020-07-16 NOTE — H&P
History of Present Illness:  The patient is a 48 y o  female who presents with complaints of left cervicalgia, left cervical spondylosis, cervical facet joint syndrome    Patient Active Problem List   Diagnosis    S/P gastric bypass    Postsurgical malabsorption    Essential hypertension    Hypercholesterolemia    Sleep apnea    Tobacco user    Vitamin D deficiency       Past Medical History:   Diagnosis Date    Anxiety     Depression     h/o suicide attempt    Hypertension     Nephrolithiasis     h/o    Osteoarthritis     Radiculopathy     Vitamin D deficiency        Past Surgical History:   Procedure Laterality Date    ABDOMINAL HYSTERECTOMY      UMBERTO & BSO d/t/ endometriosis    APPENDECTOMY      CARPAL TUNNEL RELEASE Bilateral     CHOLECYSTECTOMY      ELBOW SURGERY Left 2010    ELBOW SURGERY Right     FOOT SURGERY      right great toe     HEMORROIDECTOMY      LATERAL EPICONDYLE RELEASE Right 1/25/2016    Procedure: RIGHT ELBOW TENNIS ELBOW RELEASE;  Surgeon: Manuel Meza MD;  [facility];  Service:     NECK SURGERY  2017    Dr Tammy Kevin W/ISA W/HEATH Knapp Right 4/8/2016    Procedure: Leanna Miles;  Surgeon: Yolanda Joseph DPM;  [facility];  Service: Podiatry    CA PART EXCIS 5TH METATARSAL HEAD Right 4/8/2016    Procedure: Radha Pace;  Surgeon: Yolanda Joseph DPM;  [facility];  Service: Podiatry    SHOULDER ARTHROSCOPY Right          Current Outpatient Medications:     Acetaminophen (TYLENOL EXTRA STRENGTH PO), Take by mouth as needed, Disp: , Rfl:     albuterol (PROVENTIL HFA,VENTOLIN HFA) 90 mcg/act inhaler, Inhale 2 puffs every 4 (four) hours as needed for wheezing or shortness of breath (Patient not taking: Reported on 11/11/2019), Disp: 1 Inhaler, Rfl: 0    gabapentin (NEURONTIN) 300 mg capsule, Take 600 mg by mouth 3 (three) times a day , Disp: , Rfl:     Multiple Vitamins-Minerals (MULTIVITAMIN ADULT PO), Take by mouth daily, Disp: , Rfl:     omeprazole (PriLOSEC) 10 mg delayed release capsule, Take by mouth daily 1 tablet as needed ( pt unsure of dose)  , Disp: , Rfl:     oxyCODONE-acetaminophen (PERCOCET) 5-325 mg per tablet, Take 1 tablet by mouth every 8 (eight) hours as needed, Disp: , Rfl: 0    TiZANidine (ZANAFLEX) 2 MG capsule, Take by mouth 3 (three) times a day 1 Tablet at hs  ( Pt unsure of dose), Disp: , Rfl:     TRAZODONE HCL PO, Take by mouth as needed, Disp: , Rfl:     Allergies   Allergen Reactions    Ciprofloxacin Other (See Comments)     Mouth swells, angioedema and blister    Morphine And Related GI Intolerance    Sulfa Antibiotics Swelling     Oral angioedema and blister       Physical Exam: There were no vitals filed for this visit    General: Awake, Alert, Oriented x 3, Mood and affect appropriate  Respiratory: Respirations even and unlabored  Cardiovascular: Peripheral pulses intact; no edema  Musculoskeletal Exam:  Limited cervical range of motion, tender cervical facet joints, positive cervical facet loading test left-sided      Assessment: cervical spondylosis    Plan:  Left cervical medial branch blocks under fluoroscopic guidance

## 2020-07-24 ENCOUNTER — TRANSCRIBE ORDERS (OUTPATIENT)
Dept: RADIOLOGY | Facility: HOSPITAL | Age: 55
End: 2020-07-24

## 2020-08-05 ENCOUNTER — TELEPHONE (OUTPATIENT)
Dept: SURGERY | Facility: HOSPITAL | Age: 55
End: 2020-08-05

## 2020-08-06 ENCOUNTER — HOSPITAL ENCOUNTER (OUTPATIENT)
Dept: INTERVENTIONAL RADIOLOGY/VASCULAR | Facility: HOSPITAL | Age: 55
Discharge: HOME | End: 2020-08-06
Admitting: PHYSICAL MEDICINE & REHABILITATION
Payer: COMMERCIAL

## 2020-08-06 VITALS
DIASTOLIC BLOOD PRESSURE: 65 MMHG | HEART RATE: 66 BPM | RESPIRATION RATE: 16 BRPM | TEMPERATURE: 97.8 F | SYSTOLIC BLOOD PRESSURE: 116 MMHG | OXYGEN SATURATION: 98 %

## 2020-08-06 DIAGNOSIS — M12.88 OTHER SPECIFIC ARTHROPATHIES, NOT ELSEWHERE CLASSIFIED, OTHER SPECIFIED SITE: ICD-10-CM

## 2020-08-06 DIAGNOSIS — M47.892 OTHER SPONDYLOSIS, CERVICAL REGION: ICD-10-CM

## 2020-08-06 PROCEDURE — 99152 MOD SED SAME PHYS/QHP 5/>YRS: CPT

## 2020-08-06 RX ORDER — SODIUM CHLORIDE 9 MG/ML
50 INJECTION, SOLUTION INTRAVENOUS CONTINUOUS
Status: CANCELLED | OUTPATIENT
Start: 2020-08-06

## 2020-08-06 RX ORDER — LIDOCAINE HYDROCHLORIDE 10 MG/ML
INJECTION, SOLUTION EPIDURAL; INFILTRATION; INTRACAUDAL; PERINEURAL CODE/TRAUMA/SEDATION MEDICATION
Status: COMPLETED | OUTPATIENT
Start: 2020-08-06 | End: 2020-08-06

## 2020-08-06 RX ORDER — MIDAZOLAM HYDROCHLORIDE 2 MG/2ML
INJECTION, SOLUTION INTRAMUSCULAR; INTRAVENOUS CODE/TRAUMA/SEDATION MEDICATION
Status: COMPLETED | OUTPATIENT
Start: 2020-08-06 | End: 2020-08-06

## 2020-08-06 RX ORDER — LIDOCAINE WITH 8.4% SOD BICARB 0.9%(10ML)
SYRINGE (ML) INJECTION CODE/TRAUMA/SEDATION MEDICATION
Status: COMPLETED | OUTPATIENT
Start: 2020-08-06 | End: 2020-08-06

## 2020-08-06 RX ORDER — SODIUM CHLORIDE 9 MG/ML
50 INJECTION, SOLUTION INTRAVENOUS CONTINUOUS
Status: DISCONTINUED | OUTPATIENT
Start: 2020-08-06 | End: 2020-08-10 | Stop reason: HOSPADM

## 2020-08-06 RX ORDER — KETOROLAC TROMETHAMINE 30 MG/ML
INJECTION, SOLUTION INTRAMUSCULAR; INTRAVENOUS CODE/TRAUMA/SEDATION MEDICATION
Status: COMPLETED | OUTPATIENT
Start: 2020-08-06 | End: 2020-08-06

## 2020-08-06 RX ORDER — CLINDAMYCIN PHOSPHATE 600 MG/50ML
600 INJECTION INTRAVENOUS ONCE
Status: COMPLETED | OUTPATIENT
Start: 2020-08-06 | End: 2020-08-06

## 2020-08-06 RX ORDER — FENTANYL CITRATE 50 UG/ML
INJECTION, SOLUTION INTRAMUSCULAR; INTRAVENOUS CODE/TRAUMA/SEDATION MEDICATION
Status: COMPLETED | OUTPATIENT
Start: 2020-08-06 | End: 2020-08-06

## 2020-08-06 RX ADMIN — FENTANYL CITRATE 50 MCG: 50 INJECTION INTRAMUSCULAR; INTRAVENOUS at 13:24

## 2020-08-06 RX ADMIN — FENTANYL CITRATE 50 MCG: 50 INJECTION INTRAMUSCULAR; INTRAVENOUS at 13:22

## 2020-08-06 RX ADMIN — MIDAZOLAM HYDROCHLORIDE 1 MG: 1 INJECTION, SOLUTION INTRAMUSCULAR; INTRAVENOUS at 13:24

## 2020-08-06 RX ADMIN — KETOROLAC TROMETHAMINE 30 MG: 30 INJECTION, SOLUTION INTRAMUSCULAR; INTRAVENOUS at 13:20

## 2020-08-06 RX ADMIN — LIDOCAINE HYDROCHLORIDE 6 ML: 10 INJECTION, SOLUTION EPIDURAL; INFILTRATION; INTRACAUDAL; PERINEURAL at 13:34

## 2020-08-06 RX ADMIN — Medication 4 ML: at 13:34

## 2020-08-06 RX ADMIN — MIDAZOLAM HYDROCHLORIDE 1 MG: 1 INJECTION, SOLUTION INTRAMUSCULAR; INTRAVENOUS at 13:22

## 2020-08-06 RX ADMIN — SODIUM CHLORIDE 50 ML/HR: 0.9 INJECTION, SOLUTION INTRAVENOUS at 12:21

## 2020-08-06 RX ADMIN — CLINDAMYCIN IN 5 PERCENT DEXTROSE 600 MG: 12 INJECTION, SOLUTION INTRAVENOUS at 12:54

## 2020-08-06 RX ADMIN — IOHEXOL 4 ML: 350 INJECTION, SOLUTION INTRAVENOUS at 13:34

## 2020-08-06 NOTE — H&P
History of Present Illness: The patient is a 48 y o  female who presents with complaints of left cervicalgia, cervical spondylosis, cervical facet joint syndrome    Patient Active Problem List   Diagnosis    S/P gastric bypass    Postsurgical malabsorption    Essential hypertension    Hypercholesterolemia    Sleep apnea    Tobacco user    Vitamin D deficiency       Past Medical History:   Diagnosis Date    Anxiety     Depression     h/o suicide attempt    Hypertension     Nephrolithiasis     h/o    Osteoarthritis     Radiculopathy     Vitamin D deficiency        Past Surgical History:   Procedure Laterality Date    ABDOMINAL HYSTERECTOMY      UMBERTO & BSO d/t/ endometriosis    APPENDECTOMY      CARPAL TUNNEL RELEASE Bilateral     CHOLECYSTECTOMY      ELBOW SURGERY Left 2010    ELBOW SURGERY Right     FOOT SURGERY      right great toe     HEMORROIDECTOMY      LATERAL EPICONDYLE RELEASE Right 1/25/2016    Procedure: RIGHT ELBOW TENNIS ELBOW RELEASE;  Surgeon: Violeta Nogueira MD;  [facility];  Service:     NECK SURGERY  2017    Dr Mariaa Flanagan W/SPARKLEC W/HEATH Hickey Right 4/8/2016    Procedure: Nimo Moreno;  Surgeon: Naseem Salazar DPM;  [facility];  Service: Podiatry    [place] PART EXCIS 5TH METATARSAL HEAD Right 4/8/2016    Procedure: Trista Alexis;  Surgeon: Naseem Salazar DPM;  [facility];  Service: Podiatry    SHOULDER ARTHROSCOPY Right          Current Outpatient Medications:     Acetaminophen (TYLENOL EXTRA STRENGTH PO), Take by mouth as needed, Disp: , Rfl:     gabapentin (NEURONTIN) 300 mg capsule, Take 600 mg by mouth 3 (three) times a day , Disp: , Rfl:     Multiple Vitamins-Minerals (MULTIVITAMIN ADULT PO), Take by mouth daily, Disp: , Rfl:     omeprazole (PriLOSEC) 10 mg delayed release capsule, Take by mouth daily 1 tablet as needed ( pt unsure of dose)  , Disp: , Rfl:     oxyCODONE-acetaminophen (PERCOCET) 5-325 mg per tablet, Take 1 tablet by mouth every 8 (eight) hours as needed, Disp: , Rfl: 0    TiZANidine (ZANAFLEX) 2 MG capsule, Take by mouth 3 (three) times a day 1 Tablet at hs  ( Pt unsure of dose), Disp: , Rfl:     albuterol (PROVENTIL HFA,VENTOLIN HFA) 90 mcg/act inhaler, Inhale 2 puffs every 4 (four) hours as needed for wheezing or shortness of breath (Patient not taking: Reported on 11/11/2019), Disp: 1 Inhaler, Rfl: 0    Current Facility-Administered Medications:     sodium chloride 0 9 % infusion, 50 mL/hr, Intravenous, Continuous, Kem Nicole MD, Last Rate: 50 mL/hr at 08/06/20 1221, 50 mL/hr at 08/06/20 1221    Allergies   Allergen Reactions    Ciprofloxacin Other (See Comments)     Mouth swells, angioedema and blister    Morphine And Related GI Intolerance    Sulfa Antibiotics Swelling     Oral angioedema and blister       Physical Exam:   Vitals:    08/06/20 1200   BP: 135/74   Pulse: 62   Resp: 20   Temp: 97 7 °F (36 5 °C)   SpO2: 99%     General: Awake, Alert, Oriented x 3, Mood and affect appropriate  Respiratory: Respirations even and unlabored  Cardiovascular: Peripheral pulses intact; no edema  Musculoskeletal Exam:  Limited cervical range of motion, tender left low cervical facet joints, negative Spurling's test    Assessment:   1  Other specific arthropathies, not elsewhere classified, other specified site    2   Other spondylosis, cervical region        Plan:  Diagnostic left cervical medial branch blocks under fluoroscopic guidance

## 2020-08-06 NOTE — H&P
History of Present Illness:  The patient is a 48 y o  female who presents with complaints of     Patient Active Problem List   Diagnosis    S/P gastric bypass    Postsurgical malabsorption    Essential hypertension    Hypercholesterolemia    Sleep apnea    Tobacco user    Vitamin D deficiency       Past Medical History:   Diagnosis Date    Anxiety     Depression     h/o suicide attempt    Hypertension     Nephrolithiasis     h/o    Osteoarthritis     Radiculopathy     Vitamin D deficiency        Past Surgical History:   Procedure Laterality Date    ABDOMINAL HYSTERECTOMY      UMBERTO & BSO d/t/ endometriosis    APPENDECTOMY      CARPAL TUNNEL RELEASE Bilateral     CHOLECYSTECTOMY      ELBOW SURGERY Left 2010    ELBOW SURGERY Right     FOOT SURGERY      right great toe     HEMORROIDECTOMY      LATERAL EPICONDYLE RELEASE Right 1/25/2016    Procedure: RIGHT ELBOW TENNIS ELBOW RELEASE;  Surgeon: Jes Hooks MD;  [facility];  Service:     NECK SURGERY  2017    Dr Brad Fismhan W/ISA W/HEATH Reyna Right 4/8/2016    Procedure: Vladislav Duncna;  Surgeon: Sumi Pollard DPM;  [facility];  Service: Podiatry    NJ PART EXCIS 5TH METATARSAL HEAD Right 4/8/2016    Procedure: Chantale Mcghee;  Surgeon: Sumi Pollard DPM;  [facility];  Service: Podiatry    SHOULDER ARTHROSCOPY Right          Current Outpatient Medications:     Acetaminophen (TYLENOL EXTRA STRENGTH PO), Take by mouth as needed, Disp: , Rfl:     albuterol (PROVENTIL HFA,VENTOLIN HFA) 90 mcg/act inhaler, Inhale 2 puffs every 4 (four) hours as needed for wheezing or shortness of breath (Patient not taking: Reported on 11/11/2019), Disp: 1 Inhaler, Rfl: 0    gabapentin (NEURONTIN) 300 mg capsule, Take 600 mg by mouth 3 (three) times a day , Disp: , Rfl:     Multiple Vitamins-Minerals (MULTIVITAMIN ADULT PO), Take by mouth daily, Disp: , Rfl:     omeprazole (PriLOSEC) 10 mg delayed release capsule, Take by mouth daily 1 tablet as needed ( pt unsure of dose)  , Disp: , Rfl:     oxyCODONE-acetaminophen (PERCOCET) 5-325 mg per tablet, Take 1 tablet by mouth every 8 (eight) hours as needed, Disp: , Rfl: 0    TiZANidine (ZANAFLEX) 2 MG capsule, Take by mouth 3 (three) times a day 1 Tablet at hs  ( Pt unsure of dose), Disp: , Rfl:     TRAZODONE HCL PO, Take by mouth as needed, Disp: , Rfl:     Allergies   Allergen Reactions    Ciprofloxacin Other (See Comments)     Mouth swells, angioedema and blister    Morphine And Related GI Intolerance    Sulfa Antibiotics Swelling     Oral angioedema and blister       Physical Exam: There were no vitals filed for this visit  General: Awake, Alert, Oriented x 3, Mood and affect appropriate  Respiratory: Respirations even and unlabored  Cardiovascular: Peripheral pulses intact; no edema  Musculoskeletal Exam: see chart     AS    Assessment: No diagnosis found      Plan:  As schedule

## 2020-08-20 ENCOUNTER — TRANSCRIBE ORDERS (OUTPATIENT)
Dept: ADMINISTRATIVE | Facility: HOSPITAL | Age: 55
End: 2020-08-20

## 2020-08-20 DIAGNOSIS — M54.6 PAIN IN THORACIC SPINE: Primary | ICD-10-CM

## 2020-10-16 ENCOUNTER — TRANSCRIBE ORDERS (OUTPATIENT)
Dept: ADMINISTRATIVE | Facility: HOSPITAL | Age: 55
End: 2020-10-16

## 2020-10-16 DIAGNOSIS — Z12.31 ENCOUNTER FOR SCREENING MAMMOGRAM FOR MALIGNANT NEOPLASM OF BREAST: Primary | ICD-10-CM

## 2021-03-29 ENCOUNTER — APPOINTMENT (OUTPATIENT)
Dept: LAB | Facility: HOSPITAL | Age: 56
End: 2021-03-29
Payer: COMMERCIAL

## 2021-03-29 ENCOUNTER — TRANSCRIBE ORDERS (OUTPATIENT)
Dept: LAB | Facility: HOSPITAL | Age: 56
End: 2021-03-29

## 2021-03-29 DIAGNOSIS — Z13.220 SCREENING FOR LIPOID DISORDERS: ICD-10-CM

## 2021-03-29 DIAGNOSIS — R74.02 NONSPECIFIC ELEVATION OF LEVELS OF TRANSAMINASE OR LACTIC ACID DEHYDROGENASE (LDH): ICD-10-CM

## 2021-03-29 DIAGNOSIS — M25.549 ARTHRALGIA OF HAND, UNSPECIFIED LATERALITY: ICD-10-CM

## 2021-03-29 DIAGNOSIS — R74.01 NONSPECIFIC ELEVATION OF LEVELS OF TRANSAMINASE OR LACTIC ACID DEHYDROGENASE (LDH): ICD-10-CM

## 2021-03-29 DIAGNOSIS — Z13.1 SCREENING FOR DIABETES MELLITUS: ICD-10-CM

## 2021-03-29 DIAGNOSIS — Z13.220 SCREENING FOR LIPOID DISORDERS: Primary | ICD-10-CM

## 2021-03-29 LAB
AFP-TM SERPL-MCNC: 3.7 NG/ML (ref 0.5–8)
ALBUMIN SERPL BCP-MCNC: 3.7 G/DL (ref 3.5–5)
ALP SERPL-CCNC: 142 U/L (ref 46–116)
ALT SERPL W P-5'-P-CCNC: 19 U/L (ref 12–78)
ANION GAP SERPL CALCULATED.3IONS-SCNC: 2 MMOL/L (ref 4–13)
AST SERPL W P-5'-P-CCNC: 16 U/L (ref 5–45)
BILIRUB SERPL-MCNC: 0.28 MG/DL (ref 0.2–1)
BUN SERPL-MCNC: 10 MG/DL (ref 5–25)
CALCIUM SERPL-MCNC: 9.1 MG/DL (ref 8.3–10.1)
CHLORIDE SERPL-SCNC: 110 MMOL/L (ref 100–108)
CHOLEST SERPL-MCNC: 233 MG/DL (ref 50–200)
CO2 SERPL-SCNC: 31 MMOL/L (ref 21–32)
CREAT SERPL-MCNC: 0.65 MG/DL (ref 0.6–1.3)
CRP SERPL QL: <3 MG/L
ERYTHROCYTE [DISTWIDTH] IN BLOOD BY AUTOMATED COUNT: 12.2 % (ref 11.6–15.1)
ERYTHROCYTE [SEDIMENTATION RATE] IN BLOOD: 9 MM/HOUR (ref 0–29)
EST. AVERAGE GLUCOSE BLD GHB EST-MCNC: 105 MG/DL
GFR SERPL CREATININE-BSD FRML MDRD: 102 ML/MIN/1.73SQ M
GGT SERPL-CCNC: 21 U/L (ref 5–85)
GLUCOSE P FAST SERPL-MCNC: 75 MG/DL (ref 65–99)
HAV IGM SER QL: NORMAL
HBA1C MFR BLD: 5.3 %
HBV CORE IGM SER QL: NORMAL
HBV SURFACE AG SER QL: NORMAL
HCT VFR BLD AUTO: 46.3 % (ref 34.8–46.1)
HCV AB SER QL: NORMAL
HDLC SERPL-MCNC: 72 MG/DL
HGB BLD-MCNC: 14.9 G/DL (ref 11.5–15.4)
LDLC SERPL CALC-MCNC: 144 MG/DL (ref 0–100)
MCH RBC QN AUTO: 33.2 PG (ref 26.8–34.3)
MCHC RBC AUTO-ENTMCNC: 32.2 G/DL (ref 31.4–37.4)
MCV RBC AUTO: 103 FL (ref 82–98)
NONHDLC SERPL-MCNC: 161 MG/DL
PLATELET # BLD AUTO: 186 THOUSANDS/UL (ref 149–390)
PMV BLD AUTO: 11 FL (ref 8.9–12.7)
POTASSIUM SERPL-SCNC: 4.2 MMOL/L (ref 3.5–5.3)
PROT SERPL-MCNC: 7.1 G/DL (ref 6.4–8.2)
RBC # BLD AUTO: 4.49 MILLION/UL (ref 3.81–5.12)
SODIUM SERPL-SCNC: 143 MMOL/L (ref 136–145)
TRIGL SERPL-MCNC: 86 MG/DL
WBC # BLD AUTO: 4.19 THOUSAND/UL (ref 4.31–10.16)

## 2021-03-29 PROCEDURE — 86618 LYME DISEASE ANTIBODY: CPT

## 2021-03-29 PROCEDURE — 85027 COMPLETE CBC AUTOMATED: CPT

## 2021-03-29 PROCEDURE — 85652 RBC SED RATE AUTOMATED: CPT

## 2021-03-29 PROCEDURE — 82105 ALPHA-FETOPROTEIN SERUM: CPT

## 2021-03-29 PROCEDURE — 86038 ANTINUCLEAR ANTIBODIES: CPT

## 2021-03-29 PROCEDURE — 86430 RHEUMATOID FACTOR TEST QUAL: CPT

## 2021-03-29 PROCEDURE — 80061 LIPID PANEL: CPT

## 2021-03-29 PROCEDURE — 80074 ACUTE HEPATITIS PANEL: CPT

## 2021-03-29 PROCEDURE — 80053 COMPREHEN METABOLIC PANEL: CPT

## 2021-03-29 PROCEDURE — 83036 HEMOGLOBIN GLYCOSYLATED A1C: CPT

## 2021-03-29 PROCEDURE — 36415 COLL VENOUS BLD VENIPUNCTURE: CPT

## 2021-03-29 PROCEDURE — 82977 ASSAY OF GGT: CPT

## 2021-03-29 PROCEDURE — 86140 C-REACTIVE PROTEIN: CPT

## 2021-03-30 LAB
B BURGDOR IGG+IGM SER-ACNC: 73
RHEUMATOID FACT SER QL LA: NEGATIVE

## 2021-03-31 LAB — RYE IGE QN: NEGATIVE

## 2021-09-27 ENCOUNTER — OFFICE VISIT (OUTPATIENT)
Dept: DENTISTRY | Facility: CLINIC | Age: 56
End: 2021-09-27

## 2021-09-27 VITALS — DIASTOLIC BLOOD PRESSURE: 68 MMHG | SYSTOLIC BLOOD PRESSURE: 121 MMHG | TEMPERATURE: 97.2 F | HEART RATE: 55 BPM

## 2021-09-27 DIAGNOSIS — Z01.20 DENTAL EXAMINATION: Primary | ICD-10-CM

## 2021-09-27 PROCEDURE — D0140 LIMITED ORAL EVALUATION - PROBLEM FOCUSED: HCPCS | Performed by: DENTIST

## 2021-09-27 PROCEDURE — D0220 INTRAORAL - PERIAPICAL FIRST RADIOGRAPHIC IMAGE: HCPCS | Performed by: DENTIST

## 2021-09-27 NOTE — PROGRESS NOTES
Emergency:    Pt presents for emergency appointment with tooth pain and bad odor on #30  PA taken and reviewed  Recommended extraction of #30 due to large decay  Pt understands and consents to treatment   Pt should be scheduled on another day due to the possibility of becoming surgical     NV: Extraction of #30

## 2021-09-28 ENCOUNTER — OFFICE VISIT (OUTPATIENT)
Dept: DENTISTRY | Facility: CLINIC | Age: 56
End: 2021-09-28

## 2021-09-28 VITALS — TEMPERATURE: 98 F | SYSTOLIC BLOOD PRESSURE: 139 MMHG | DIASTOLIC BLOOD PRESSURE: 81 MMHG | HEART RATE: 67 BPM

## 2021-09-28 DIAGNOSIS — Z01.21 ENCOUNTER FOR DENTAL EXAMINATION AND CLEANING WITH ABNORMAL FINDINGS: Primary | ICD-10-CM

## 2021-09-28 PROCEDURE — D7140 EXTRACTION, ERUPTED TOOTH OR EXPOSED ROOT (ELEVATION AND/OR FORCEPS REMOVAL): HCPCS | Performed by: DENTIST

## 2021-09-29 NOTE — PROGRESS NOTES
Oral Surgery    Chang Huynh presents for Ext #30    [facility], patient denies any changes  Obtained a direct and personal consent  Risks and complications were explained  Pt agreed and consented  Consent scanned in doc center  Pre-Op BP WNL  Administered 2 cc of 2 % Lidocaine w/ 1:100,000 epi via block  ? Adequate anesthesia obtained, flap was raised from tooth 28 area to tooth 31, reflected gingiva, elevated, and extracted #30   Socket irrigated, and 4 0 chromic gut sutures placed  Upon dismissal, patient received POI, gauze      NV: 9 resin

## 2021-10-07 ENCOUNTER — HOSPITAL ENCOUNTER (OUTPATIENT)
Dept: RADIOLOGY | Facility: HOSPITAL | Age: 56
Discharge: HOME | End: 2021-10-07
Payer: COMMERCIAL

## 2021-10-07 DIAGNOSIS — N28.1 CYST OF KIDNEY, ACQUIRED: ICD-10-CM

## 2021-10-07 DIAGNOSIS — R74.01 ELEVATION OF LEVELS OF LIVER TRANSAMINASE LEVELS: ICD-10-CM

## 2021-10-07 PROCEDURE — 76770 US EXAM ABDO BACK WALL COMP: CPT

## 2021-10-07 PROCEDURE — 76700 US EXAM ABDOM COMPLETE: CPT

## 2021-10-20 ENCOUNTER — HOSPITAL ENCOUNTER (OUTPATIENT)
Dept: RADIOLOGY | Facility: HOSPITAL | Age: 56
Discharge: HOME | End: 2021-10-20
Payer: COMMERCIAL

## 2021-10-20 DIAGNOSIS — J90 PLEURAL EFFUSION: ICD-10-CM

## 2021-10-20 PROCEDURE — 71046 X-RAY EXAM CHEST 2 VIEWS: CPT

## 2022-01-10 ENCOUNTER — OFFICE VISIT (OUTPATIENT)
Dept: DENTISTRY | Facility: CLINIC | Age: 57
End: 2022-01-10

## 2022-01-10 VITALS — TEMPERATURE: 97.1 F | HEART RATE: 64 BPM | SYSTOLIC BLOOD PRESSURE: 136 MMHG | DIASTOLIC BLOOD PRESSURE: 95 MMHG

## 2022-01-10 DIAGNOSIS — K02.9 CARIES: Primary | ICD-10-CM

## 2022-01-10 PROCEDURE — D2335 RESIN-BASED COMPOSITE - 4 OR MORE SURFACES OR INVOLVING INCISAL ANGLE (ANTERIOR): HCPCS | Performed by: DENTIST

## 2022-01-10 NOTE — PROGRESS NOTES
Composite Filling    Stevie Handy presents for composite filling #9 MIFL  PMH reviewed, no changes  Discussed with patient need for RCT if pulp exposure occurs or in future if pulp is inflamed  Pt understands and consents  #9 MIFL comp pina:  Shade guide was used to choose A3  Applied topical benzocaine, administered 1 carp 4% articaine 1:100k epi via local infiltration  Prepped tooth #9 with 245 carbide on high speed  Caries removed with round carbide on slow speed  Placed clear matrix and wedge  Isolation with cotton rolls and dri-angles  Etch with 37% H2PO4, rinse, dry  Applied Adhese with 20 second scrub once, gentle air dry and light cured for 10s  Restored with Tetric bulk bernadine and flowable shade A3 and light cured  Refined with finishing burs, polished with enhance point  Verified occlusion, margins, and contacts  Pt was given hand mirror to confirm aesthetics  Pt requested 0 5 mm's of enameloplasty on DI of #9 to make symmetry and tooth size correspond  She was informed that this is natural tooth structure  Following the refinement, pt was satisfied  She was invited to have #8 MIFL repaired to improve overall symmetry and aesthetics  She stated that she would consider it  Pt explains that she followed through with OS extractions in lower right       NV: IRAIS/prophy (pt explains that she would like to have slight pain/bleeding in upper right examined)

## 2022-01-11 ENCOUNTER — OFFICE VISIT (OUTPATIENT)
Dept: DENTISTRY | Facility: CLINIC | Age: 57
End: 2022-01-11

## 2022-01-11 VITALS — SYSTOLIC BLOOD PRESSURE: 130 MMHG | TEMPERATURE: 97.5 F | DIASTOLIC BLOOD PRESSURE: 74 MMHG | HEART RATE: 63 BPM

## 2022-01-11 DIAGNOSIS — Z00.00 ENCOUNTER FOR SCREENING AND PREVENTATIVE CARE: Primary | ICD-10-CM

## 2022-01-11 PROCEDURE — D0150 COMPREHENSIVE ORAL EVALUATION - NEW OR ESTABLISHED PATIENT: HCPCS | Performed by: DENTIST

## 2022-01-11 PROCEDURE — D0210 INTRAORAL - COMPLETE SERIES OF RADIOGRAPHIC IMAGES: HCPCS

## 2022-01-11 NOTE — PROGRESS NOTES
Asa II    Pt  Arrived for hyg  Visit  Has not had FMX since 2015    FMX, COMP EXAM, spot probed     CC: pain #4  Heavy smoker-2 packs day-has tried to quit unsuccessfully  FMX taken  Pt not currently interested in partials due to financials  Dr Hema Duran examined:red flat irregular border 1x1mm floor mouth-told pt  Keep eye on it  Pt  Complained on long lasting local anesthesia last visit-USE LIDO NV  Perio: generalized 3-4mm, BUP  Heavy gen  Plaque supra and subgingival    NV: prophy 60 mins  NVV: rest  #4w/Lido    NVVV: rests :2,20,8,28

## 2022-03-03 ENCOUNTER — TRANSCRIBE ORDERS (OUTPATIENT)
Dept: ADMINISTRATIVE | Facility: HOSPITAL | Age: 57
End: 2022-03-03

## 2022-03-03 DIAGNOSIS — I83.813 VARICOSE VEINS OF BILATERAL LOWER EXTREMITIES WITH PAIN: Primary | ICD-10-CM

## 2022-03-09 ENCOUNTER — APPOINTMENT (OUTPATIENT)
Dept: LAB | Age: 57
End: 2022-03-09
Payer: MEDICARE

## 2022-03-09 ENCOUNTER — HOSPITAL ENCOUNTER (OUTPATIENT)
Dept: RADIOLOGY | Age: 57
Discharge: HOME | End: 2022-03-09
Payer: MEDICARE

## 2022-03-09 VITALS — HEIGHT: 66 IN | BODY MASS INDEX: 22.66 KG/M2 | WEIGHT: 141 LBS

## 2022-03-09 DIAGNOSIS — Z01.84 ANTIBODY RESPONSE EXAM: ICD-10-CM

## 2022-03-09 DIAGNOSIS — Z11.1 SCREENING FOR TUBERCULOSIS: ICD-10-CM

## 2022-03-09 DIAGNOSIS — Z12.31 ENCOUNTER FOR SCREENING MAMMOGRAM FOR MALIGNANT NEOPLASM OF BREAST: ICD-10-CM

## 2022-03-09 LAB — RUBV IGG SERPL IA-ACNC: 68.9 IU/ML

## 2022-03-09 PROCEDURE — 86735 MUMPS ANTIBODY: CPT

## 2022-03-09 PROCEDURE — 77063 BREAST TOMOSYNTHESIS BI: CPT

## 2022-03-09 PROCEDURE — 77067 SCR MAMMO BI INCL CAD: CPT

## 2022-03-09 PROCEDURE — 86480 TB TEST CELL IMMUN MEASURE: CPT

## 2022-03-09 PROCEDURE — 36415 COLL VENOUS BLD VENIPUNCTURE: CPT

## 2022-03-09 PROCEDURE — 86765 RUBEOLA ANTIBODY: CPT

## 2022-03-09 PROCEDURE — 86762 RUBELLA ANTIBODY: CPT

## 2022-03-10 LAB
MEV IGG SER QL: NORMAL
MUV IGG SER QL: NORMAL

## 2022-03-11 LAB
GAMMA INTERFERON BACKGROUND BLD IA-ACNC: 0.08 IU/ML
M TB IFN-G BLD-IMP: NEGATIVE
M TB IFN-G CD4+ BCKGRND COR BLD-ACNC: -0.01 IU/ML
M TB IFN-G CD4+ BCKGRND COR BLD-ACNC: 0 IU/ML
MITOGEN IGNF BCKGRD COR BLD-ACNC: 6.18 IU/ML

## 2022-03-23 NOTE — PROGRESS NOTES
Assessment/Plan:    Varicose veins of bilateral lower extremities with pain  49 y o  female patient w/ a PMH significant for HTN, s/p gastric bypass w/ postsurgical malabsorption, chronic back pain s/p ACDF C5-6, and tobacco use (1-2 ppd) presents today w/ c/o painful varicosities in the BLE  Imaging  · LEV duplex (1/3/22, [facility]) negative for DVT in the LLE    Assessment  · Symptomatic superficial and truncal varicosities noted to the BLE  Plan   Recommend 3 month trial of conservative measures to include daily use of compression stockings, lower extremity elevation, low-sodium diet, aerobic activity, weight management and skin moisturization   Discussed smoking cessation  Patient defers intervention   LEVDR in 3 months   Return to office with surgeon in 3 months with LEVDR for re-evaluation and discussion of surgical options   Instructed to contact the office in the interim with any questions, concerns or new symptoms       Diagnoses and all orders for this visit:    Varicose veins of bilateral lower extremities with pain  -     Ambulatory Referral to Vascular Surgery  -     Compression Stocking  -     VAS reflux lower limb venous duplex study with reflux assessment, complete bilateral; Future          Subjective:      Patient ID: Marleny Blue is a 49 y o  female  HPI:     49 y o  female with a past medical history significant for HTN, s/p gastric bypass w/ postsurgical malabsorption, chronic back pain s/p ACDF C5-6, and tobacco use (1-2 ppd) presents today w/ c/o painful varicosities in the BLE  Patient is new to our practice referred by YENIFER Laws for Varicose Veins  Patient c/o pain in the BLE, swelling and burning at times  Symptoms are associated with her varicose veins  She reports symptoms have been present for approximately 10 years  Patient denies any discoloration or wounds  Patient denies any claudication symptoms    She has not had any treatment for her symptoms  Patient smokes about 1-2 ppd  Review of Systems   Constitutional: Negative  HENT: Positive for sinus pain  Eyes: Negative  Respiratory: Negative  Cardiovascular: Positive for leg swelling  Gastrointestinal: Negative  Endocrine: Negative  Genitourinary: Negative  Musculoskeletal: Negative  Skin: Negative  Allergic/Immunologic: Negative  Neurological: Positive for weakness and headaches  Hematological: Negative  Psychiatric/Behavioral: The patient is nervous/anxious  The following portions of the patient's history were reviewed and updated as appropriate: allergies, current medications, past family history, past medical history, past social history, past surgical history, and problem list     I have reviewed and made appropriate changes to the review of systems input by the medical assistant      Vitals:    03/24/22 1447   BP: 140/90   BP Location: Left arm   Patient Position: Sitting   Cuff Size: Adult   Pulse: 74   Weight: 61 5 kg (135 lb 9 6 oz)   Height: 5' 6" (1 676 m)       Patient Active Problem List   Diagnosis    S/P gastric bypass    Postsurgical malabsorption    Essential hypertension    Hypercholesterolemia    Sleep apnea    Tobacco user    Vitamin D deficiency    Varicose veins of bilateral lower extremities with pain       Past Surgical History:   Procedure Laterality Date    ABDOMINAL HYSTERECTOMY      UMBERTO & BSO d/t/ endometriosis    APPENDECTOMY      CARPAL TUNNEL RELEASE Bilateral     CHOLECYSTECTOMY      ELBOW SURGERY Left 2010    ELBOW SURGERY Right     FOOT SURGERY      right great toe     HEMORROIDECTOMY      LATERAL EPICONDYLE RELEASE Right 1/25/2016    Procedure: RIGHT ELBOW TENNIS ELBOW RELEASE;  Surgeon: Kenyatta Esteban MD;  Location:  MAIN OR;  Service:     NECK [facility]  2017    [facility] [place] [facility] 4/8/2016    Procedure: BUNIONECTOMY; Surgeon: Lexus Duarte DPM;  [facility];  Service: Podiatry    CA PART EXCIS 5TH METATARSAL HEAD Right 4/8/2016    Procedure: Grayce Call;  Surgeon: Lexus Duarte DPM;  [facility];  Service: Podiatry    SHOULDER ARTHROSCOPY Right     TOTAL ABDOMINAL HYSTERECTOMY      age 25   Rita Leisure TOTAL ABDOMINAL HYSTERECTOMY W/ BILATERAL SALPINGOOPHORECTOMY Bilateral 1986    age 25       Family History   Problem Relation Age of Onset    Heart disease Mother     Hypertension Mother     Rheum arthritis Mother     Heart disease Father     Hypertension Father     Diabetes Father     Bipolar disorder Sister     Early death Brother     Cervical cancer Daughter 24    No Known Problems Maternal Grandmother     Esophageal cancer Maternal Grandfather [de-identified]    No Known Problems Paternal Grandmother     No Known Problems Paternal Grandfather     No Known Problems Daughter     No Known Problems Maternal Aunt     No Known Problems Maternal Aunt     No Known Problems Maternal Aunt     No Known Problems Paternal Aunt     No Known Problems Paternal Aunt     No Known Problems Paternal Aunt        Social History     Socioeconomic History    Marital status: Single     Spouse name: Not on file    Number of children: Not on file    Years of education: Not on file    Highest education level: Not on file   Occupational History    Not on file   Tobacco Use    Smoking status: Current Every Day Smoker     Packs/day: 2 00     Years: 35 00     Pack years: 70 00    Smokeless tobacco: Never Used    Tobacco comment: smokes X 35 yrs, 1ppd; heavy tobacco smoker -10cigs/day as per [employer]   Substance and Sexual Activity    Alcohol use: No     Comment: socially    Drug use: No    Sexual activity: Yes   Other Topics Concern    Not on file   Social History Narrative    Not on file     Social Determinants of Health     Financial Resource Strain: Not on file   Food Insecurity: Not on file   Transportation Needs: Not on file   Physical Activity: Not on file   Stress: Not on file   Social Connections: Not on file   Intimate Partner Violence: Not on file   Housing Stability: Not on file       Allergies   Allergen Reactions    Ciprofloxacin Other (See Comments) and Hives     Mouth swells, angioedema and blister    Morphine Other (See Comments)    Morphine And Related GI Intolerance    Sulfa Antibiotics Swelling and Hives     Oral angioedema and blister    Nicotine Rash         Current Outpatient Medications:     gabapentin (NEURONTIN) 300 mg capsule, Take 600 mg by mouth 3 (three) times a day , Disp: , Rfl:     oxyCODONE-acetaminophen (PERCOCET) 5-325 mg per tablet, Take 1 tablet by mouth every 8 (eight) hours as needed, Disp: , Rfl: 0    TiZANidine (ZANAFLEX) 2 MG capsule, Take by mouth 3 (three) times a day 1 Tablet at hs  ( Pt unsure of dose), Disp: , Rfl:       Objective:    /90 (BP Location: Left arm, Patient Position: Sitting, Cuff Size: Adult)   Pulse 74   Ht 5' 6" (1 676 m)   Wt 61 5 kg (135 lb 9 6 oz)   LMP 01/25/1986 (LMP Unknown)   BMI 21 89 kg/m²        Physical Exam  Vitals reviewed  HENT:      Head: Normocephalic  Mouth/Throat:      Mouth: Mucous membranes are moist    Eyes:      Extraocular Movements: Extraocular movements intact  Cardiovascular:      Rate and Rhythm: Normal rate and regular rhythm  Pulses:           Popliteal pulses are 2+ on the right side and 2+ on the left side  Dorsalis pedis pulses are 2+ on the right side and 2+ on the left side  Posterior tibial pulses are 2+ on the right side and 2+ on the left side  Pulmonary:      Effort: Pulmonary effort is normal  No respiratory distress  Musculoskeletal:         General: Normal range of motion  Right lower leg: No edema  Left lower leg: No edema  Skin:     General: Skin is warm and dry  Capillary Refill: Capillary refill takes less than 2 seconds        Findings: No erythema, signs of injury, rash or wound  Comments: Superficial and truncal varicosities noted to patient's BLE  Pictures obtained in office today  Neurological:      General: No focal deficit present  Mental Status: She is alert and oriented to person, place, and time  Cranial Nerves: Cranial nerves are intact  Motor: Motor function is intact  Gait: Gait is intact     Psychiatric:         Mood and Affect: Mood normal          Behavior: Behavior normal          RLE      LLE      BLE          Ashley Mendoza, [address] [facility]  Vascular Surgery  3/24/2022 3:17 PM

## 2022-03-24 ENCOUNTER — CONSULT (OUTPATIENT)
Dept: VASCULAR SURGERY | Facility: CLINIC | Age: 57
End: 2022-03-24
Payer: MEDICARE

## 2022-03-24 VITALS
SYSTOLIC BLOOD PRESSURE: 140 MMHG | HEART RATE: 74 BPM | WEIGHT: 135.6 LBS | DIASTOLIC BLOOD PRESSURE: 90 MMHG | HEIGHT: 66 IN | BODY MASS INDEX: 21.79 KG/M2

## 2022-03-24 DIAGNOSIS — I83.813 VARICOSE VEINS OF BILATERAL LOWER EXTREMITIES WITH PAIN: Primary | ICD-10-CM

## 2022-03-24 PROCEDURE — 99243 OFF/OP CNSLTJ NEW/EST LOW 30: CPT | Performed by: NURSE PRACTITIONER

## 2022-03-24 NOTE — ASSESSMENT & PLAN NOTE
49 y o  female patient w/ a PMH significant for HTN, s/p gastric bypass w/ postsurgical malabsorption, chronic back pain s/p ACDF C5-6, and tobacco use (1-2 ppd) presents today w/ c/o painful varicosities in the BLE  Imaging  · LEV duplex (1/3/22, [facility]) negative for DVT in the LLE    Assessment  · Symptomatic superficial and truncal varicosities noted to the BLE  Plan   Recommend 3 month trial of conservative measures to include daily use of compression stockings, lower extremity elevation, low-sodium diet, aerobic activity, weight management and skin moisturization   Discussed smoking cessation  Patient defers intervention      LEVDR in 3 months   Return to office with surgeon in 3 months with LEVDR for re-evaluation and discussion of surgical options   Instructed to contact the office in the interim with any questions, concerns or new symptoms

## 2022-03-24 NOTE — PATIENT INSTRUCTIONS
Varicose Veins   AMBULATORY CARE:   Varicose veins  are veins that become large, twisted, and swollen  They are common on the back of the calves, knees, and thighs  Varicose veins are caused by valves in your veins that do not work properly  This causes blood to collect and increase pressure in the veins of your legs  The increased pressure causes your veins to stretch, get larger, swell, and twist        Common symptoms include the following: Your symptoms may be worse after you stand or sit for long periods of time  You may have any of the following:  · Blue, purple, or bulging veins in your legs     · Pain, swelling, or muscle cramps in your legs    · Feeling of fatigue or heaviness in your legs    · Cramping in your legs    Seek care immediately if:   · You have a wound that does not heal or is infected  · You have an injury that has broken your skin and caused your varicose veins to bleed  · Your leg is swollen and hard  · You notice that your legs or feet are turning blue or black  · Your leg feels warm, tender, and painful  It may look swollen and red  Contact your healthcare provider if:   · You have pain in your leg that does not go away or gets worse  · You notice sudden large bruising on your legs  · You have a rash on your leg  · Your symptoms keep you from doing your daily activities  · You have questions or concerns about your condition or care  Treatment of varicose veins  aims to decrease symptoms, improve appearance, and prevent further problems  Treatment will depend on which veins are affected and how severe your condition is  You may need procedures to treat or remove your varicose veins  For example, your healthcare provider may inject a solution or use a laser to close the varicose veins  Surgery to remove long veins may also be done  Ask your healthcare provider for more information about procedures used to treat varicose veins    Manage varicose veins:   · Do not sit or stand for long periods of time  This can cause the blood to collect in your legs and make your symptoms worse  Bend or rotate your ankles several times every hour  Walk around for a few minutes every hour to get blood moving in your legs  · Do not cross your legs when you sit  This decreases blood flow to your feet and can make your symptoms worse  · Do not wear tight clothing or shoes  Do not wear high-heeled shoes  Do not wear clothes that are tight around the waist or knees  · Maintain a healthy weight  Being overweight or obese can make your varicose veins worse  Ask your healthcare provider how much you should weigh  Ask him or her to help you create a weight loss plan if you are overweight  · Wear pressure stockings as directed  The stockings are tight and put pressure on your legs  They improve blood flow and help prevent clots  · Elevate your legs  Keep them above the level of your heart for 15 to 30 minutes several times a day  You can also prop the end of your bed up slightly to elevate your legs while you sleep  This will help blood to flow back to your heart  · Get regular exercise  Talk to your healthcare provider about the best exercise plan for you  Exercise can improve blood flow to your legs and feet  Follow up with your doctor as directed:  Write down your questions so you remember to ask them during your visits  © Copyright [employer] 2022 Information is for End User's use only and may not be sold, redistributed or otherwise used for commercial purposes  All illustrations and images included in CareNotes® are the copyrighted property of A D A M , Inc  or Nolan Sandoval   The above information is an  only  It is not intended as medical advice for individual conditions or treatments  Talk to your doctor, nurse or pharmacist before following any medical regimen to see if it is safe and effective for you

## 2022-07-22 ENCOUNTER — HOSPITAL ENCOUNTER (OUTPATIENT)
Dept: NON INVASIVE DIAGNOSTICS | Facility: HOSPITAL | Age: 57
Discharge: HOME | End: 2022-07-22
Payer: MEDICARE

## 2022-07-22 DIAGNOSIS — I83.813 VARICOSE VEINS OF BILATERAL LOWER EXTREMITIES WITH PAIN: ICD-10-CM

## 2022-07-22 PROCEDURE — 93970 EXTREMITY STUDY: CPT

## 2022-07-23 PROCEDURE — 93970 EXTREMITY STUDY: CPT | Performed by: SURGERY

## 2022-08-25 ENCOUNTER — OFFICE VISIT (OUTPATIENT)
Dept: VASCULAR SURGERY | Facility: CLINIC | Age: 57
End: 2022-08-25
Payer: MEDICARE

## 2022-08-25 VITALS
BODY MASS INDEX: 22.5 KG/M2 | HEART RATE: 69 BPM | HEIGHT: 66 IN | DIASTOLIC BLOOD PRESSURE: 84 MMHG | SYSTOLIC BLOOD PRESSURE: 142 MMHG | WEIGHT: 140 LBS

## 2022-08-25 DIAGNOSIS — I83.813 VARICOSE VEINS OF BILATERAL LOWER EXTREMITIES WITH PAIN: Primary | ICD-10-CM

## 2022-08-25 PROCEDURE — 99213 OFFICE O/P EST LOW 20 MIN: CPT | Performed by: SURGERY

## 2022-08-25 RX ORDER — TRAZODONE HYDROCHLORIDE 50 MG/1
TABLET ORAL 3 TIMES DAILY
COMMUNITY

## 2022-08-25 RX ORDER — OMEPRAZOLE 40 MG/1
40 CAPSULE, DELAYED RELEASE ORAL DAILY
COMMUNITY
Start: 2022-07-09

## 2022-08-25 NOTE — PATIENT INSTRUCTIONS
1) Leg veins  -your ultrasound test showed that you have minimal venous disease/insufficiency  -to help control your symptoms, try wearing compression, especially at work, elevate your legs, stay active, continue good skin care  -if you want to buy compression online, please measure your leg and fit yourself to the size guide for the brand that you choose

## 2022-08-25 NOTE — PROGRESS NOTES
Assessment/Plan:    Pt is a 48 yo F w/ SMILEY, HTN, tobacco use, HLD, obesity s/p gastric bypass '18, presents for evaluation of venous disease    Varicose veins of bilateral lower extremities with pain  -presents with retics/spiders and few varicosities  -reviewed reflux study which showed only one valve with insuff; all others competent  -discussed venous disease; she does not have significant underlying disease and has mild symptoms, thus no surgical management is needed; she has retics/spiders which are cosmetic in nature; discussed that she could have sclero injections for cosmesis; she is not interested in this  -suggested compression, elevation for her mild edema and leg discomfort, particularly during work; continue exercise, healthy weight, skin care  -f/u PRN    Subjective:      Patient ID: Anna Singh is a 49 y o  female  Pt presents today to rev LEVDR done on 7/22/22 for BLE Varicose Veins Lt>Rt  Pt reports painful, swelling, and burning veins  Pt denies wearing compression  Pt denies being previously treated  HPI:    Patient presents for evaluation of venous disease  She has some bulging veins  They are sometimes painful  She first noticed these in '10  She has intermittent swelling, worst at the end of the day  She has pain, burning, tingling, sharp pain  She also has known spinal disease  She feels like her skin is crawling  She tried wearing compression for about 4 weeks but didn't have any improvement with this  She did not get fitted for these but borrowed her sisters  She elevates her legs occasionally  She walks to work 1/2mile each way because her car broke  She works in a [employer] and is standing/walking most of the day  She denies hx of blood clots  She is s/p gastric bypass surgery about '18  She lost 180 lbs        The following portions of the patient's history were reviewed and updated as appropriate: allergies, current medications, past family history, past medical history, past social history, past surgical history and problem list     Review of Systems   Constitutional: Negative  HENT: Negative  Eyes: Negative  Respiratory: Negative  Cardiovascular: Positive for leg swelling  Painful Veins   Gastrointestinal: Negative  Endocrine: Negative  Genitourinary: Negative  Musculoskeletal: Negative  Skin: Positive for color change  Negative for wound  Allergic/Immunologic: Negative  Neurological: Negative  Hematological: Negative  Psychiatric/Behavioral: Negative  Objective:      /84 (BP Location: Right arm, Patient Position: Sitting, Cuff Size: Standard)   Pulse 69   Ht 5' 6" (1 676 m)   Wt 63 5 kg (140 lb)   LMP 01/25/1986 (LMP Unknown)   BMI 22 60 kg/m²          Physical Exam  Vitals and nursing note reviewed  Constitutional:       Appearance: She is well-developed  HENT:      Head: Normocephalic and atraumatic  Eyes:      Conjunctiva/sclera: Conjunctivae normal    Cardiovascular:      Rate and Rhythm: Normal rate and regular rhythm  Pulses:           Radial pulses are 2+ on the right side and 2+ on the left side  Dorsalis pedis pulses are 2+ on the right side and 2+ on the left side  Posterior tibial pulses are 2+ on the right side and 2+ on the left side  Heart sounds: Normal heart sounds  No murmur heard  Pulmonary:      Effort: Pulmonary effort is normal  No respiratory distress  Breath sounds: Normal breath sounds  No wheezing or rales  Abdominal:      General: There is no distension  Palpations: Abdomen is soft  Tenderness: There is no abdominal tenderness  There is no rebound  Musculoskeletal:         General: Normal range of motion  Cervical back: Normal range of motion and neck supple  Right lower leg: No edema  Left lower leg: No edema  Skin:     General: Skin is warm and dry        Comments: L anterior and lateral leg with retics and spiders; there is one varicosities on the lateral knee and anterior shin which are mildly bulging    No stasis changes or wounds   Neurological:      Mental Status: She is alert and oriented to person, place, and time  Psychiatric:         Behavior: Behavior normal            I have reviewed and made appropriate changes to the review of systems input by the medical assistant      Vitals:    08/25/22 1453   BP: 142/84   BP Location: Right arm   Patient Position: Sitting   Cuff Size: Standard   Pulse: 69   Weight: 63 5 kg (140 lb)   Height: 5' 6" (1 676 m)       Patient Active Problem List   Diagnosis    S/P gastric bypass    Postsurgical malabsorption    Essential hypertension    Hypercholesterolemia    Sleep apnea    Tobacco user    Vitamin D deficiency    Varicose veins of bilateral lower extremities with pain       Past Surgical History:   Procedure Laterality Date    ABDOMINAL HYSTERECTOMY      UMBERTO & BSO d/t/ endometriosis    APPENDECTOMY      CARPAL TUNNEL RELEASE Bilateral     CHOLECYSTECTOMY      ELBOW SURGERY Left 2010    ELBOW SURGERY Right     FOOT SURGERY      right great toe     HEMORROIDECTOMY      LATERAL EPICONDYLE RELEASE Right 1/25/2016    Procedure: RIGHT ELBOW TENNIS ELBOW RELEASE;  Surgeon: Jana Ardon MD;  [facility];  Service:     NECK SURGERY  2017    Dr Willem Pritchett W/[facility] W/[facility] Shelda [place] Right 4/8/2016    Procedure: Jenny Cornelius;  Surgeon: Lexus Duarte DPM;  [facility];  Service: Podiatry    GA PART EXCIS 5TH METATARSAL HEAD Right 4/8/2016    Procedure: Joana Call;  Surgeon: Lexus Duarte DPM;  [facility];  Service: Podiatry    SHOULDER ARTHROSCOPY Right     TOTAL ABDOMINAL HYSTERECTOMY      age 25   Ag TOTAL ABDOMINAL HYSTERECTOMY W/ BILATERAL SALPINGOOPHORECTOMY Bilateral 1986    age 25       Family History   Problem Relation Age of Onset    Heart disease Mother     Hypertension Mother     Rheum arthritis Mother     Heart disease Father     Hypertension Father     Diabetes Father     Bipolar disorder Sister     Early death Brother     Cervical cancer Daughter 24    No Known Problems Maternal Grandmother     Esophageal cancer Maternal Grandfather [de-identified]    No Known Problems Paternal Grandmother     No Known Problems Paternal Grandfather     No Known Problems Daughter     No Known Problems Maternal Aunt     No Known Problems Maternal Aunt     No Known Problems Maternal Aunt     No Known Problems Paternal Aunt     No Known Problems Paternal Aunt     No Known Problems Paternal Aunt        Social History     Socioeconomic History    Marital status: Single     Spouse name: Not on file    Number of children: Not on file    Years of education: Not on file    Highest education level: Not on file   Occupational History    Not on file   Tobacco Use    Smoking status: Current Every Day Smoker     Packs/day: 2 00     Years: 35 00     Pack years: 70 00    Smokeless tobacco: Never Used    Tobacco comment: smokes X 35 yrs, 1ppd; heavy tobacco smoker -10cigs/day as per [employer]   Substance and Sexual Activity    Alcohol use: No     Comment: socially    Drug use: No    Sexual activity: Yes   Other Topics Concern    Not on file   Social History Narrative    Not on file     Social Determinants of Health     Financial Resource Strain: Not on file   Food Insecurity: Not on file   Transportation Needs: Not on file   Physical Activity: Not on file   Stress: Not on file   Social Connections: Not on file   Intimate Partner Violence: Not on file   Housing Stability: Not on file       Allergies   Allergen Reactions    Ciprofloxacin Other (See Comments) and Hives     Mouth swells, angioedema and blister    Morphine Other (See Comments)    Morphine And Related GI Intolerance    Sulfa Antibiotics Swelling and Hives     Oral angioedema and blister    Nicotine Rash         Current Outpatient Medications:     gabapentin (NEURONTIN) 300 mg capsule, Take 600 mg by mouth 3 (three) times a day , Disp: , Rfl:     omeprazole (PriLOSEC) 40 MG capsule, Take 40 mg by mouth daily, Disp: , Rfl:     oxyCODONE-acetaminophen (PERCOCET) 5-325 mg per tablet, Take 1 tablet by mouth every 8 (eight) hours as needed, Disp: , Rfl: 0    TiZANidine (ZANAFLEX) 2 MG capsule, Take by mouth 3 (three) times a day 1 Tablet at hs   ( Pt unsure of dose), Disp: , Rfl:     traZODone (DESYREL) 50 mg tablet, 3 (three) times a day, Disp: , Rfl:

## 2023-01-10 ENCOUNTER — OFFICE VISIT (OUTPATIENT)
Dept: OBGYN CLINIC | Facility: CLINIC | Age: 58
End: 2023-01-10

## 2023-01-10 VITALS
SYSTOLIC BLOOD PRESSURE: 134 MMHG | DIASTOLIC BLOOD PRESSURE: 80 MMHG | BODY MASS INDEX: 25.16 KG/M2 | WEIGHT: 142 LBS | HEIGHT: 63 IN

## 2023-01-10 DIAGNOSIS — L90.0 LICHEN SCLEROSUS ET ATROPHICUS: Primary | ICD-10-CM

## 2023-01-10 RX ORDER — HYDROXYCHLOROQUINE SULFATE 200 MG/1
200 TABLET, FILM COATED ORAL 2 TIMES DAILY
COMMUNITY
Start: 2023-01-05

## 2023-01-10 RX ORDER — CLOBETASOL PROPIONATE 0.5 MG/G
OINTMENT TOPICAL 2 TIMES DAILY
Qty: 60 G | Refills: 1 | Status: SHIPPED | OUTPATIENT
Start: 2023-01-10
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
## 2023-04-23 ENCOUNTER — APPOINTMENT (OUTPATIENT)
Dept: RADIOLOGY | Facility: HOSPITAL | Age: 58
End: 2023-04-23

## 2023-04-23 ENCOUNTER — HOSPITAL ENCOUNTER (OUTPATIENT)
Dept: RADIOLOGY | Facility: HOSPITAL | Age: 58
Discharge: HOME | End: 2023-04-23

## 2023-04-23 DIAGNOSIS — M54.16 RADICULOPATHY, LUMBAR REGION: ICD-10-CM

## 2023-05-21 ENCOUNTER — HOSPITAL ENCOUNTER (OUTPATIENT)
Dept: RADIOLOGY | Facility: HOSPITAL | Age: 58
Discharge: HOME | End: 2023-05-21

## 2023-05-21 DIAGNOSIS — M47.812 SPONDYLOSIS WITHOUT MYELOPATHY OR RADICULOPATHY, CERVICAL REGION: ICD-10-CM

## 2023-05-24 ENCOUNTER — OFFICE VISIT (OUTPATIENT)
Dept: OBGYN CLINIC | Facility: CLINIC | Age: 58
End: 2023-05-24

## 2023-05-24 VITALS — DIASTOLIC BLOOD PRESSURE: 92 MMHG | BODY MASS INDEX: 24.71 KG/M2 | SYSTOLIC BLOOD PRESSURE: 150 MMHG | WEIGHT: 141.2 LBS

## 2023-05-24 DIAGNOSIS — L90.0 LICHEN SCLEROSUS: ICD-10-CM

## 2023-05-24 DIAGNOSIS — L90.0 LICHEN SCLEROSUS ET ATROPHICUS: ICD-10-CM

## 2023-05-24 DIAGNOSIS — N94.10 PAIN IN FEMALE GENITALIA ON INTERCOURSE: Primary | ICD-10-CM

## 2023-05-24 RX ORDER — METHYLPREDNISOLONE 4 MG/1
TABLET ORAL
COMMUNITY
Start: 2023-05-23

## 2023-05-24 RX ORDER — CLOBETASOL PROPIONATE 0.5 MG/G
OINTMENT TOPICAL 2 TIMES DAILY
Qty: 60 G | Refills: 1 | Status: SHIPPED | OUTPATIENT
Start: 2023-05-24

## 2023-05-24 NOTE — PROGRESS NOTES
Assessment/Plan:    Can trial pelvic floor PT for dyspareunia  Encouraged consistent use of clobetasol -  Not just PRN use  Follow up in 4-6 weeks for recheck  Pain in female genitalia on intercourse  -     Ambulatory Referral to Physical Therapy; Future    Lichen sclerosus    Lichen sclerosus et atrophicus  -     clobetasol (TEMOVATE) 0 05 % ointment; Apply topically 2 (two) times a day    Other orders  -     methylPREDNISolone 4 MG tablet therapy pack; take by mouth as directed FOLLOW DIRECTIONS ON BACK OF FOIL PACK        Subjective:      Patient ID: Marcelle Cook is a 56 y o  female  Orvis Shaper has only been using clobetasol on occasion, but overall feels symptoms have improved  Concerned by her inability to have penetrative sex  Declines internal exam   Was uncomfortable even with a finger in her vagina  The following portions of the patient's history were reviewed and updated as appropriate: allergies, current medications and problem list     Review of Systems      Objective:      /92 (BP Location: Left arm, Patient Position: Sitting)   Wt 64 kg (141 lb 3 2 oz)   LMP 01/25/1986 (LMP Unknown)   BMI 24 71 kg/m²          Physical Exam  Exam conducted with a chaperone present     Genitourinary:

## 2023-07-07 ENCOUNTER — OFFICE VISIT (OUTPATIENT)
Dept: DENTISTRY | Facility: CLINIC | Age: 58
End: 2023-07-07

## 2023-07-07 VITALS — TEMPERATURE: 95.7 F | HEART RATE: 77 BPM | SYSTOLIC BLOOD PRESSURE: 138 MMHG | DIASTOLIC BLOOD PRESSURE: 71 MMHG

## 2023-07-07 DIAGNOSIS — Z01.21 ENCOUNTER FOR DENTAL EXAMINATION AND CLEANING WITH ABNORMAL FINDINGS: Primary | ICD-10-CM

## 2023-07-07 DIAGNOSIS — K02.9 CARIES INVOLVING MULTIPLE SURFACES OF TOOTH: ICD-10-CM

## 2023-07-07 DIAGNOSIS — K02.9 DENTAL CARIES: ICD-10-CM

## 2023-07-07 PROBLEM — K57.30 DIVERTICULAR DISEASE OF COLON: Status: ACTIVE | Noted: 2023-07-07

## 2023-07-07 PROBLEM — Z92.89 HISTORY OF SCREENING MAMMOGRAPHY: Status: ACTIVE | Noted: 2020-10-16

## 2023-07-07 PROBLEM — M48.02 CERVICAL STENOSIS OF SPINAL CANAL: Status: ACTIVE | Noted: 2019-06-27

## 2023-07-07 PROBLEM — M47.9 SPONDYLOSIS: Status: ACTIVE | Noted: 2023-07-07

## 2023-07-07 PROBLEM — K76.9 CHRONIC NONALCOHOLIC LIVER DISEASE: Status: ACTIVE | Noted: 2023-07-07

## 2023-07-07 PROBLEM — K75.9 INFLAMMATORY LIVER DISEASE: Status: ACTIVE | Noted: 2017-03-09

## 2023-07-07 PROBLEM — M79.7 PRIMARY FIBROMYALGIA SYNDROME: Status: ACTIVE | Noted: 2023-07-07

## 2023-07-07 PROBLEM — M54.12 BRACHIAL NEURITIS: Status: ACTIVE | Noted: 2019-06-27

## 2023-07-07 PROBLEM — M79.9: Status: ACTIVE | Noted: 2019-06-27

## 2023-07-07 PROBLEM — I83.90 VARICOSE VEINS OF LOWER EXTREMITY: Status: ACTIVE | Noted: 2022-03-24

## 2023-07-07 PROBLEM — R79.89 ELEVATED LFTS: Status: ACTIVE | Noted: 2021-02-16

## 2023-07-07 PROBLEM — M53.9: Status: ACTIVE | Noted: 2019-06-27

## 2023-07-07 PROBLEM — U07.1 COVID-19: Status: ACTIVE | Noted: 2022-01-03

## 2023-07-07 PROBLEM — M54.9 BACKACHE: Status: ACTIVE | Noted: 2023-07-07

## 2023-07-07 PROBLEM — M79.7 FIBROMYOSITIS: Status: ACTIVE | Noted: 2023-07-07

## 2023-07-07 PROCEDURE — D0120 PERIODIC ORAL EVALUATION - ESTABLISHED PATIENT: HCPCS | Performed by: DENTIST

## 2023-07-07 PROCEDURE — D0230 INTRAORAL - PERIAPICAL EACH ADDITIONAL RADIOGRAPHIC IMAGE: HCPCS

## 2023-07-07 PROCEDURE — D1110 PROPHYLAXIS - ADULT: HCPCS

## 2023-07-07 PROCEDURE — D0220 INTRAORAL - PERIAPICAL FIRST RADIOGRAPHIC IMAGE: HCPCS

## 2023-07-07 PROCEDURE — D0274 BITEWINGS - 4 RADIOGRAPHIC IMAGES: HCPCS

## 2023-07-07 RX ORDER — SODIUM FLUORIDE 5 MG/G
GEL, DENTIFRICE DENTAL
Qty: 100 ML | Refills: 2 | Status: CANCELLED | OUTPATIENT
Start: 2023-07-07

## 2023-07-07 RX ORDER — DILTIAZEM HYDROCHLORIDE 120 MG/1
1 CAPSULE, COATED, EXTENDED RELEASE ORAL 2 TIMES DAILY
COMMUNITY

## 2023-07-07 RX ORDER — SODIUM FLUORIDE 5 MG/G
3 GEL, DENTIFRICE DENTAL
Qty: 100 ML | Refills: 3 | Status: SHIPPED | OUTPATIENT
Start: 2023-07-07

## 2023-07-07 RX ORDER — FOLIC ACID 1 MG/1
1 TABLET ORAL DAILY
COMMUNITY
Start: 2023-06-15

## 2023-07-07 RX ORDER — BUPROPION HYDROCHLORIDE 150 MG/1
TABLET, EXTENDED RELEASE ORAL
COMMUNITY

## 2023-07-10 PROBLEM — K02.9 TOOTH DECAY: Status: ACTIVE | Noted: 2023-07-10

## 2023-07-10 NOTE — DENTAL PROCEDURE DETAILS
Yakovholland Brennan presents for a Periodic exam. Verbal consent for treatment given in addition to the forms. Reviewed health history - Patient is ASA II  Consents signed: Yes     Perio: Gingivitis 4-5mm pockets present on mandibular teeth- requires SRP on mandibular teeth  Pain Scale: 3  Caries Assessment: High  Radiographs: Bitewings x4  Periapical teeth #4, 6, 9, 20 - radiographs taken by assistant. Oral Hygiene instruction reviewed and given. Recommended Hygiene recall visits with the Debra Friend. Adult prophy, polish and floss  OH: poor    Dr. Manuel Dejesus exam:  Several areas of decay present:  #2-DBML- explained post/core and crown may be needed if decay reaches nerve. Poor prognosis due to size of decay. #4-DB  #5-DO  #6-MOB  #7-L5  #8-DI and L5  #9-I  #19-MO  #20-MOD  #21-B5, DO  #27-B5 and M  #28-    Pt requires SRP on LL and LR 4+ teeth each quad. Partial denture may be recommended in future. Recommended PreviDent 5000 toothpaste- Dr. Rayray Livingston sent prescription to [facility].      NV: SRP LR with hygiene  NV2: SRP LL with hygiene  NV3: Start with filling #20

## 2023-07-13 ENCOUNTER — OFFICE VISIT (OUTPATIENT)
Dept: DENTISTRY | Facility: CLINIC | Age: 58
End: 2023-07-13

## 2023-07-13 VITALS — DIASTOLIC BLOOD PRESSURE: 84 MMHG | SYSTOLIC BLOOD PRESSURE: 135 MMHG | HEART RATE: 71 BPM

## 2023-07-13 DIAGNOSIS — K02.9 DENTAL CARIES: Primary | ICD-10-CM

## 2023-07-13 PROCEDURE — D2393 RESIN-BASED COMPOSITE - 3 SURFACES, POSTERIOR: HCPCS

## 2023-07-13 NOTE — DENTAL PROCEDURE DETAILS
Patient presents for a dental restoration and verbally consents for treatment:  Reviewed health history-  Pt is ASA type II  Treatment consents signed: Yes  Tooth #20  Surfaces: MOD  Dental Anesthesia:  1.7 ml 2% Lidocaine 1:100K Epi  Material:   Etch Ivoclar bond and resin, flowable used on base of distal box, packable composite for remainder of cavity  Shade: A2  Distal portion was very close to pulp, patient informed on possibility of post-op sensitivity and eventual root canal treatment  Next visit: Continue resins while wait for SRP pre-auth

## 2023-07-19 ENCOUNTER — TRANSCRIBE ORDERS (OUTPATIENT)
Dept: ADMINISTRATIVE | Facility: HOSPITAL | Age: 58
End: 2023-07-19

## 2023-07-19 DIAGNOSIS — Z12.31 ENCOUNTER FOR SCREENING MAMMOGRAM FOR MALIGNANT NEOPLASM OF BREAST: Primary | ICD-10-CM

## 2023-07-19 DIAGNOSIS — Z13.6 SCREENING FOR CARDIOVASCULAR CONDITION: ICD-10-CM

## 2023-07-26 ENCOUNTER — HOSPITAL ENCOUNTER (OUTPATIENT)
Dept: RADIOLOGY | Facility: HOSPITAL | Age: 58
Discharge: HOME | End: 2023-07-26
Payer: MEDICARE

## 2023-07-26 DIAGNOSIS — F17.210 NICOTINE DEPENDENCE, CIGARETTES, UNCOMPLICATED: ICD-10-CM

## 2023-07-26 PROCEDURE — 71271 CT THORAX LUNG CANCER SCR C-: CPT

## 2023-08-28 ENCOUNTER — OFFICE VISIT (OUTPATIENT)
Dept: DENTISTRY | Facility: CLINIC | Age: 58
End: 2023-08-28

## 2023-08-28 VITALS — DIASTOLIC BLOOD PRESSURE: 72 MMHG | SYSTOLIC BLOOD PRESSURE: 124 MMHG | HEART RATE: 81 BPM

## 2023-08-28 DIAGNOSIS — K02.9 DENTAL CARIES: Primary | ICD-10-CM

## 2023-08-28 PROCEDURE — D2335 RESIN-BASED COMPOSITE - 4 OR MORE SURFACES OR INVOLVING INCISAL ANGLE (ANTERIOR): HCPCS

## 2023-08-28 NOTE — DENTAL PROCEDURE DETAILS
Pt presents for a dental restoration and verbally consents for treatment:    Reviewed health history-  Pt is ASA type 2  Treatment consents signed: Yes   Perio: Periodontitis   Pain Scale: 0   Caries Assessment: HIGH  Radiographs: Films are current and reviewed with patient  Oral Hygiene instruction reviewed and given  Hygiene recall visits recommended to the pt. Due to rampant caries, pt agreed to focus on anterior teeth to determine restorability for esthetic considerations in order to finalize a treatment plan. Informed pt that clinical and radiographic presentation of caries on #8 is large and very close to approaching the pulpal chamber, but we can excavate and see how much tooth structure is left. Informed pt that if there is pulpal communication, either RCT or EXT would be indicated. Pt understood. Pt  agree with the diagnosis of caries and the  proposed treatment plan for the resin restoration:  Tooth #8 Surfaces: AKBAR  Dental Anesthesia:  1.7 ml 2% Lidocaine w/ 1:100k epi via infiltration. Rubber dam placed with 212 butterfly clamp ties to string floss on #8. Prepared into existing class 4 mesial and class 5 distal carious lesions with high speed #4 round bur to remove caries and undermined enamel. Used slow speed #4 round bur to slowly excavate caries approaching pulpal chamber and remove residual decay, followed by spoon excavator. Applied Ivoclar bond to deepest portion of margin and near pulpal chamber and scrubbed 20 seconds, air thinned and cured. Tetric evoflow resin placed  along both mesial and distal margin and over pulpal floor and deep margin elevation was performed. Cured 20 seconds on both mesial and distal.     Large wooden wedge placed in both contacts with circumferential mylar strip wrapping around the lingual. Acid etched and rinsed, ivoclar bond scrubbed in, air dried and cured, followed by Tetric bulk bernadine resin which was packed in 2 mm increments and cured.   Shade: A2    Polished restoration with polishing burs. Checked occlusion with articulating paper. Pt is edge to edge and previously lost an incisal composite on #9. Informed pt I will relieve as much as possible but I cannot fully remove tooth from occluding on composite without seriously compromising esthetics. Re-informed pt that this is a temporary fix and tooth needs a crown considering it is restorable.  Adjusted occlusion from incisal edge and from lingual.     Prognosis is Guarded  Referrals Needed: No  Next visit: continue caries control and SRP's

## 2023-10-03 ENCOUNTER — HOSPITAL ENCOUNTER (OUTPATIENT)
Dept: RADIOLOGY | Age: 58
Discharge: HOME | End: 2023-10-03
Payer: MEDICARE

## 2023-10-03 VITALS — BODY MASS INDEX: 24.98 KG/M2 | WEIGHT: 141 LBS | HEIGHT: 63 IN

## 2023-10-03 DIAGNOSIS — Z12.31 ENCOUNTER FOR SCREENING MAMMOGRAM FOR MALIGNANT NEOPLASM OF BREAST: ICD-10-CM

## 2023-10-03 PROCEDURE — 77067 SCR MAMMO BI INCL CAD: CPT

## 2023-10-03 PROCEDURE — 77063 BREAST TOMOSYNTHESIS BI: CPT

## 2024-07-18 ENCOUNTER — APPOINTMENT (RX ONLY)
Dept: URBAN - METROPOLITAN AREA CLINIC 122 | Facility: CLINIC | Age: 59
Setting detail: DERMATOLOGY
End: 2024-07-18

## 2024-07-18 DIAGNOSIS — L28.0 LICHEN SIMPLEX CHRONICUS: ICD-10-CM

## 2024-07-18 DIAGNOSIS — L81.0 POSTINFLAMMATORY HYPERPIGMENTATION: ICD-10-CM

## 2024-07-18 DIAGNOSIS — L72.3 SEBACEOUS CYST: ICD-10-CM

## 2024-07-18 DIAGNOSIS — L40.0 PSORIASIS VULGARIS: ICD-10-CM

## 2024-07-18 PROCEDURE — ? PRESCRIPTION

## 2024-07-18 PROCEDURE — ? COUNSELING

## 2024-07-18 PROCEDURE — ? DEFER

## 2024-07-18 PROCEDURE — 99204 OFFICE O/P NEW MOD 45 MIN: CPT

## 2024-07-18 RX ORDER — CLOBETASOL PROPIONATE 0.5 MG/G
OINTMENT TOPICAL
Qty: 30 | Refills: 2 | Status: ERX | COMMUNITY
Start: 2024-07-18

## 2024-07-18 RX ORDER — TRIAMCINOLONE ACETONIDE 1 MG/G
CREAM TOPICAL BID
Qty: 30 | Refills: 2 | Status: ERX | COMMUNITY
Start: 2024-07-18

## 2024-07-18 RX ADMIN — TRIAMCINOLONE ACETONIDE: 1 CREAM TOPICAL at 00:00

## 2024-07-18 RX ADMIN — CLOBETASOL PROPIONATE: 0.5 OINTMENT TOPICAL at 00:00

## 2024-07-18 ASSESSMENT — LOCATION DETAILED DESCRIPTION DERM
LOCATION DETAILED: MID-OCCIPITAL SCALP
LOCATION DETAILED: INFERIOR THORACIC SPINE
LOCATION DETAILED: LEFT POSTERIOR NECK
LOCATION DETAILED: LEFT SUPERIOR LATERAL NECK
LOCATION DETAILED: RIGHT SUPERIOR UPPER BACK
LOCATION DETAILED: LEFT SUPERIOR MEDIAL UPPER BACK
LOCATION DETAILED: SUPERIOR THORACIC SPINE
LOCATION DETAILED: RIGHT MEDIAL UPPER BACK
LOCATION DETAILED: RIGHT SUPERIOR MEDIAL UPPER BACK

## 2024-07-18 ASSESSMENT — LOCATION SIMPLE DESCRIPTION DERM
LOCATION SIMPLE: UPPER BACK
LOCATION SIMPLE: POSTERIOR SCALP
LOCATION SIMPLE: RIGHT UPPER BACK
LOCATION SIMPLE: POSTERIOR NECK
LOCATION SIMPLE: LEFT UPPER BACK
LOCATION SIMPLE: NECK

## 2024-07-18 ASSESSMENT — LOCATION ZONE DERM
LOCATION ZONE: TRUNK
LOCATION ZONE: SCALP
LOCATION ZONE: NECK

## 2024-07-18 NOTE — PROCEDURE: DEFER
X Size Of Lesion In Cm (Optional): 0
Introduction Text (Please End With A Colon): will recheck at f/u
Detail Level: Detailed

## 2024-07-25 ENCOUNTER — APPOINTMENT (RX ONLY)
Dept: URBAN - METROPOLITAN AREA CLINIC 122 | Facility: CLINIC | Age: 59
Setting detail: DERMATOLOGY
End: 2024-07-25

## 2024-07-25 DIAGNOSIS — L72.3 SEBACEOUS CYST: ICD-10-CM

## 2024-07-25 DIAGNOSIS — L81.4 OTHER MELANIN HYPERPIGMENTATION: ICD-10-CM

## 2024-07-25 DIAGNOSIS — D485 NEOPLASM OF UNCERTAIN BEHAVIOR OF SKIN: ICD-10-CM

## 2024-07-25 PROBLEM — D48.5 NEOPLASM OF UNCERTAIN BEHAVIOR OF SKIN: Status: ACTIVE | Noted: 2024-07-25

## 2024-07-25 PROCEDURE — 11401 EXC TR-EXT B9+MARG 0.6-1 CM: CPT

## 2024-07-25 PROCEDURE — ? PRESCRIPTION

## 2024-07-25 PROCEDURE — 99213 OFFICE O/P EST LOW 20 MIN: CPT | Mod: 25

## 2024-07-25 PROCEDURE — 11421 EXC H-F-NK-SP B9+MARG 0.6-1: CPT

## 2024-07-25 PROCEDURE — ? COUNSELING

## 2024-07-25 PROCEDURE — ? PUNCH EXCISION

## 2024-07-25 PROCEDURE — 12031 INTMD RPR S/A/T/EXT 2.5 CM/<: CPT

## 2024-07-25 ASSESSMENT — LOCATION ZONE DERM
LOCATION ZONE: TRUNK
LOCATION ZONE: ARM
LOCATION ZONE: NECK

## 2024-07-25 ASSESSMENT — LOCATION DETAILED DESCRIPTION DERM
LOCATION DETAILED: LEFT ANTERIOR SHOULDER
LOCATION DETAILED: MID TRAPEZIAL NECK
LOCATION DETAILED: SUPERIOR THORACIC SPINE
LOCATION DETAILED: LEFT SUPERIOR LATERAL NECK
LOCATION DETAILED: LEFT INFRAMAMMARY CREASE (INNER QUADRANT)

## 2024-07-25 ASSESSMENT — LOCATION SIMPLE DESCRIPTION DERM
LOCATION SIMPLE: TRAPEZIAL NECK
LOCATION SIMPLE: LEFT SHOULDER
LOCATION SIMPLE: NECK
LOCATION SIMPLE: UPPER BACK
LOCATION SIMPLE: LEFT BREAST

## 2024-07-25 NOTE — PROCEDURE: PUNCH EXCISION
Medical Necessity Clause: This procedure was medically necessary because the lesion that was treated was:
Detail Level: Detailed
Size Of Lesion (*Required): 0.6
X Size Of Lesion Width In Cm (Optional): 0
Size Of Margin In Cm: 0.05
Punch Size In Mm: 6
Repair Type: None (Simple)
Intermediate / Complex Repair - Final Wound Length In Cm: 0.7
Complex Requirements: Extensive Undermining Performed?: No
Undermining Type: Entire Wound
Debridement Text: The wound edges were debrided prior to proceeding with the closure to facilitate wound healing.
Helical Rim Text: The closure involved the helical rim.
Vermilion Border Text: The closure involved the vermilion border.
Nostril Rim Text: The closure involved the nostril rim.
Retention Suture Text: Retention sutures were placed to support the closure and prevent dehiscence.
Include Undermining Statement In The Repair Note?: Yes
Anesthesia Type: 1% lidocaine with epinephrine
Anesthesia Volume In Cc: 2
Hemostasis: Electrocautery
Epidermal Sutures: 4-0 Ethilon
Epidermal Closure: simple interrupted
Wound Care: Aquaphor
Wound Dressings: a bandage
Suture Removal: 10 days
Lab: [id number]
Lab Facility: [id number]
Path Notes (To The Dermatopathologist): Please check margins.
1.5 Mm Punch Excision Text: A 1.5 mm punch biopsy was used to excise the lesion to the level of the subcutaneous fat.  Blunt dissection was used to free the lesion from the surrounding tissues and the lesion was removed.
2 Mm Punch Excision Text: A 2 mm punch biopsy was used to excise the lesion to the level of the subcutaneous fat.  Blunt dissection was used to free the lesion from the surrounding tissues and the lesion was removed.
2.5 Mm Punch Excision Text: A 2.5 mm punch biopsy was used to excise the lesion to the level of the subcutaneous fat.  Blunt dissection was used to free the lesion from the surrounding tissues and the lesion was removed.
3 Mm Punch Excision Text: A 3 mm punch biopsy was used to excise the lesion to the level of the subcutaneous fat.  Blunt dissection was used to free the lesion from the surrounding tissues and the lesion was removed.
3.5 Mm Punch Excision Text: A 3.5 mm punch biopsy was used to excise the lesion to the level of the subcutaneous fat.  Blunt dissection was used to free the lesion from the surrounding tissues and the lesion was removed.
4 Mm Punch Excision Text: A 4 mm punch biopsy was used to excise the lesion to the level of the subcutaneous fat.  Blunt dissection was used to free the lesion from the surrounding tissues and the lesion was removed.
4.5 Mm Punch Excision Text: A 4.5 mm punch biopsy was used to excise the lesion to the level of the subcutaneous fat.  Blunt dissection was used to free the lesion from the surrounding tissues and the lesion was removed.
5 Mm Punch Excision Text: A 5 mm punch biopsy was used to excise the lesion to the level of the subcutaneous fat.  Blunt dissection was used to free the lesion from the surrounding tissues and the lesion was removed.
6 Mm Punch Excision Text: A 6 mm punch biopsy was used to excise the lesion to the level of the subcutaneous fat.  Blunt dissection was used to free the lesion from the surrounding tissues and the lesion was removed.
7 Mm Punch Excision Text: A 7 mm punch biopsy was used to excise the lesion to the level of the subcutaneous fat.  Blunt dissection was used to free the lesion from the surrounding tissues and the lesion was removed.
8 Mm Punch Excision Text: A 8 mm punch biopsy was used to excise the lesion to the level of the subcutaneous fat.  Blunt dissection was used to free the lesion from the surrounding tissues and the lesion was removed.
10 Mm Punch Excision Text: A 10 mm punch biopsy was used to excise the lesion to the level of the subcutaneous fat.  Blunt dissection was used to free the lesion from the surrounding tissues and the lesion was removed.
12 Mm Punch Excision Text: A 12 mm punch biopsy was used to excise the lesion to the level of the subcutaneous fat.  Blunt dissection was used to free the lesion from the surrounding tissues and the lesion was removed.
Consent was obtained from the patient. The risks and benefits to therapy were discussed in detail. Specifically, the risks of infection, scarring, bleeding, prolonged wound healing, incomplete removal, allergy to anesthesia, nerve injury and recurrence were addressed. Prior to the procedure, the treatment site was clearly identified and confirmed by the patient. All components of Universal Protocol/PAUSE Rule completed.
Post-Care Instructions: I reviewed with the patient in detail post-care instructions. Patient is to keep the biopsy site dry overnight, and then apply bacitracin twice daily until healed. Patient may apply hydrogen peroxide soaks to remove any crusting.
Notification Instructions: Patient will be notified of biopsy results. However, patient instructed to call the office if not contacted within 2 weeks.
Billing Type: Third-Party Bill
Size Of Lesion (*Required): 0.8
Punch Size In Mm: 8
Repair Type: Intermediate
Intermediate / Complex Repair - Final Wound Length In Cm: 0.9
Deep Sutures: 3-0 Vicryl

## 2024-08-05 ENCOUNTER — APPOINTMENT (RX ONLY)
Dept: URBAN - METROPOLITAN AREA CLINIC 122 | Facility: CLINIC | Age: 59
Setting detail: DERMATOLOGY
End: 2024-08-05

## 2024-08-05 DIAGNOSIS — L72.3 SEBACEOUS CYST: ICD-10-CM

## 2024-08-05 DIAGNOSIS — Z48.02 ENCOUNTER FOR REMOVAL OF SUTURES: ICD-10-CM

## 2024-08-05 PROCEDURE — 99024 POSTOP FOLLOW-UP VISIT: CPT

## 2024-08-05 PROCEDURE — ? SUTURE REMOVAL (GLOBAL PERIOD)

## 2024-08-05 ASSESSMENT — LOCATION ZONE DERM
LOCATION ZONE: NECK
LOCATION ZONE: TRUNK

## 2024-08-05 ASSESSMENT — LOCATION DETAILED DESCRIPTION DERM
LOCATION DETAILED: SUPERIOR THORACIC SPINE
LOCATION DETAILED: LEFT SUPERIOR LATERAL NECK

## 2024-08-05 ASSESSMENT — LOCATION SIMPLE DESCRIPTION DERM
LOCATION SIMPLE: UPPER BACK
LOCATION SIMPLE: NECK

## 2024-08-05 NOTE — PROCEDURE: SUTURE REMOVAL (GLOBAL PERIOD)
Detail Level: Detailed
Add 62044 Cpt? (Important Note: In 2017 The Use Of 49733 Is Being Tracked By Cms To Determine Future Global Period Reimbursement For Global Periods): yes